# Patient Record
Sex: FEMALE | Race: WHITE | HISPANIC OR LATINO
[De-identification: names, ages, dates, MRNs, and addresses within clinical notes are randomized per-mention and may not be internally consistent; named-entity substitution may affect disease eponyms.]

---

## 2017-01-04 ENCOUNTER — APPOINTMENT (OUTPATIENT)
Dept: MAMMOGRAPHY | Facility: IMAGING CENTER | Age: 52
End: 2017-01-04

## 2017-01-04 ENCOUNTER — APPOINTMENT (OUTPATIENT)
Dept: ULTRASOUND IMAGING | Facility: IMAGING CENTER | Age: 52
End: 2017-01-04

## 2017-01-26 ENCOUNTER — APPOINTMENT (OUTPATIENT)
Dept: DERMATOLOGY | Facility: HOSPITAL | Age: 52
End: 2017-01-26

## 2017-01-26 ENCOUNTER — OUTPATIENT (OUTPATIENT)
Dept: OUTPATIENT SERVICES | Facility: HOSPITAL | Age: 52
LOS: 1 days | End: 2017-01-26
Payer: SELF-PAY

## 2017-01-26 VITALS
HEART RATE: 72 BPM | WEIGHT: 176 LBS | DIASTOLIC BLOOD PRESSURE: 74 MMHG | BODY MASS INDEX: 29.32 KG/M2 | SYSTOLIC BLOOD PRESSURE: 116 MMHG | RESPIRATION RATE: 16 BRPM | HEIGHT: 65 IN

## 2017-01-26 DIAGNOSIS — N20.0 CALCULUS OF KIDNEY: Chronic | ICD-10-CM

## 2017-01-26 DIAGNOSIS — Z90.722 ACQUIRED ABSENCE OF OVARIES, BILATERAL: Chronic | ICD-10-CM

## 2017-01-26 DIAGNOSIS — L98.9 DISORDER OF THE SKIN AND SUBCUTANEOUS TISSUE, UNSPECIFIED: ICD-10-CM

## 2017-01-26 DIAGNOSIS — Z98.89 OTHER SPECIFIED POSTPROCEDURAL STATES: Chronic | ICD-10-CM

## 2017-01-26 DIAGNOSIS — D23.9 OTHER BENIGN NEOPLASM OF SKIN, UNSPECIFIED: ICD-10-CM

## 2017-01-26 DIAGNOSIS — Z09 ENCOUNTER FOR FOLLOW-UP EXAMINATION AFTER COMPLETED TREATMENT FOR CONDITIONS OTHER THAN MALIGNANT NEOPLASM: Chronic | ICD-10-CM

## 2017-01-26 PROCEDURE — G0463: CPT

## 2017-02-08 ENCOUNTER — OUTPATIENT (OUTPATIENT)
Dept: OUTPATIENT SERVICES | Facility: HOSPITAL | Age: 52
LOS: 1 days | End: 2017-02-08
Payer: SELF-PAY

## 2017-02-08 ENCOUNTER — APPOINTMENT (OUTPATIENT)
Dept: FAMILY MEDICINE | Facility: HOSPITAL | Age: 52
End: 2017-02-08

## 2017-02-08 VITALS
HEART RATE: 111 BPM | WEIGHT: 175 LBS | SYSTOLIC BLOOD PRESSURE: 137 MMHG | DIASTOLIC BLOOD PRESSURE: 81 MMHG | RESPIRATION RATE: 16 BRPM | TEMPERATURE: 97.7 F | OXYGEN SATURATION: 96 % | BODY MASS INDEX: 29.16 KG/M2 | HEIGHT: 65 IN

## 2017-02-08 DIAGNOSIS — Z09 ENCOUNTER FOR FOLLOW-UP EXAMINATION AFTER COMPLETED TREATMENT FOR CONDITIONS OTHER THAN MALIGNANT NEOPLASM: Chronic | ICD-10-CM

## 2017-02-08 DIAGNOSIS — N20.0 CALCULUS OF KIDNEY: Chronic | ICD-10-CM

## 2017-02-08 DIAGNOSIS — M25.569 PAIN IN UNSPECIFIED KNEE: ICD-10-CM

## 2017-02-08 DIAGNOSIS — G89.29 PAIN IN UNSPECIFIED FOOT: ICD-10-CM

## 2017-02-08 DIAGNOSIS — M79.673 PAIN IN UNSPECIFIED FOOT: ICD-10-CM

## 2017-02-08 DIAGNOSIS — Z90.722 ACQUIRED ABSENCE OF OVARIES, BILATERAL: Chronic | ICD-10-CM

## 2017-02-08 DIAGNOSIS — Z98.89 OTHER SPECIFIED POSTPROCEDURAL STATES: Chronic | ICD-10-CM

## 2017-02-08 PROCEDURE — G0463: CPT

## 2017-02-10 ENCOUNTER — OUTPATIENT (OUTPATIENT)
Dept: OUTPATIENT SERVICES | Facility: HOSPITAL | Age: 52
LOS: 1 days | Discharge: HOME | End: 2017-02-10

## 2017-02-10 DIAGNOSIS — C19 MALIGNANT NEOPLASM OF RECTOSIGMOID JUNCTION: ICD-10-CM

## 2017-02-10 DIAGNOSIS — Z90.722 ACQUIRED ABSENCE OF OVARIES, BILATERAL: Chronic | ICD-10-CM

## 2017-02-10 DIAGNOSIS — N20.0 CALCULUS OF KIDNEY: Chronic | ICD-10-CM

## 2017-02-10 DIAGNOSIS — Z98.89 OTHER SPECIFIED POSTPROCEDURAL STATES: Chronic | ICD-10-CM

## 2017-02-10 DIAGNOSIS — Z09 ENCOUNTER FOR FOLLOW-UP EXAMINATION AFTER COMPLETED TREATMENT FOR CONDITIONS OTHER THAN MALIGNANT NEOPLASM: Chronic | ICD-10-CM

## 2017-02-12 ENCOUNTER — RESULT REVIEW (OUTPATIENT)
Age: 52
End: 2017-02-12

## 2017-02-13 ENCOUNTER — APPOINTMENT (OUTPATIENT)
Dept: HEMATOLOGY ONCOLOGY | Facility: CLINIC | Age: 52
End: 2017-02-13

## 2017-02-13 ENCOUNTER — OUTPATIENT (OUTPATIENT)
Dept: OUTPATIENT SERVICES | Facility: HOSPITAL | Age: 52
LOS: 1 days | End: 2017-02-13
Payer: SELF-PAY

## 2017-02-13 VITALS
DIASTOLIC BLOOD PRESSURE: 73 MMHG | HEART RATE: 84 BPM | TEMPERATURE: 97.3 F | BODY MASS INDEX: 29.68 KG/M2 | RESPIRATION RATE: 16 BRPM | WEIGHT: 178.35 LBS | SYSTOLIC BLOOD PRESSURE: 110 MMHG | OXYGEN SATURATION: 97 %

## 2017-02-13 DIAGNOSIS — Z98.89 OTHER SPECIFIED POSTPROCEDURAL STATES: Chronic | ICD-10-CM

## 2017-02-13 DIAGNOSIS — N20.0 CALCULUS OF KIDNEY: Chronic | ICD-10-CM

## 2017-02-13 DIAGNOSIS — Z09 ENCOUNTER FOR FOLLOW-UP EXAMINATION AFTER COMPLETED TREATMENT FOR CONDITIONS OTHER THAN MALIGNANT NEOPLASM: Chronic | ICD-10-CM

## 2017-02-13 DIAGNOSIS — Z90.722 ACQUIRED ABSENCE OF OVARIES, BILATERAL: Chronic | ICD-10-CM

## 2017-02-13 DIAGNOSIS — G62.0 DRUG-INDUCED POLYNEUROPATHY: ICD-10-CM

## 2017-02-13 LAB
HCT VFR BLD CALC: 34.7 % — SIGNIFICANT CHANGE UP (ref 34.5–45)
HGB BLD-MCNC: 12.2 G/DL — SIGNIFICANT CHANGE UP (ref 11.5–15.5)
MCHC RBC-ENTMCNC: 30.9 PG — SIGNIFICANT CHANGE UP (ref 27–34)
MCHC RBC-ENTMCNC: 35.2 G/DL — SIGNIFICANT CHANGE UP (ref 32–36)
MCV RBC AUTO: 87.8 FL — SIGNIFICANT CHANGE UP (ref 80–100)
PLATELET # BLD AUTO: 179 K/UL — SIGNIFICANT CHANGE UP (ref 150–400)
RBC # BLD: 3.96 M/UL — SIGNIFICANT CHANGE UP (ref 3.8–5.2)
RBC # FLD: 11.2 % — SIGNIFICANT CHANGE UP (ref 10.3–14.5)
WBC # BLD: 7.6 K/UL — SIGNIFICANT CHANGE UP (ref 3.8–10.5)
WBC # FLD AUTO: 7.6 K/UL — SIGNIFICANT CHANGE UP (ref 3.8–10.5)

## 2017-02-13 PROCEDURE — 85025 COMPLETE CBC W/AUTO DIFF WBC: CPT

## 2017-02-13 PROCEDURE — 80053 COMPREHEN METABOLIC PANEL: CPT

## 2017-02-13 PROCEDURE — 80061 LIPID PANEL: CPT

## 2017-02-13 PROCEDURE — 86431 RHEUMATOID FACTOR QUANT: CPT

## 2017-02-13 PROCEDURE — 83036 HEMOGLOBIN GLYCOSYLATED A1C: CPT

## 2017-02-13 PROCEDURE — 36415 COLL VENOUS BLD VENIPUNCTURE: CPT

## 2017-02-14 LAB
ALBUMIN SERPL ELPH-MCNC: 4.5 G/DL — SIGNIFICANT CHANGE UP (ref 3.3–5)
ALP SERPL-CCNC: 104 U/L — SIGNIFICANT CHANGE UP (ref 40–120)
ALT FLD-CCNC: 24 U/L — SIGNIFICANT CHANGE UP (ref 10–45)
ANION GAP SERPL CALC-SCNC: 16 MMOL/L — SIGNIFICANT CHANGE UP (ref 5–17)
AST SERPL-CCNC: 18 U/L — SIGNIFICANT CHANGE UP (ref 10–40)
BILIRUB SERPL-MCNC: 0.4 MG/DL — SIGNIFICANT CHANGE UP (ref 0.2–1.2)
BUN SERPL-MCNC: 12 MG/DL — SIGNIFICANT CHANGE UP (ref 7–23)
CALCIUM SERPL-MCNC: 9.3 MG/DL — SIGNIFICANT CHANGE UP (ref 8.4–10.5)
CEA SERPL-MCNC: 0.5 NG/ML — SIGNIFICANT CHANGE UP (ref 0–3.8)
CHLORIDE SERPL-SCNC: 102 MMOL/L — SIGNIFICANT CHANGE UP (ref 96–108)
CO2 SERPL-SCNC: 23 MMOL/L — SIGNIFICANT CHANGE UP (ref 22–31)
CREAT SERPL-MCNC: 0.55 MG/DL — SIGNIFICANT CHANGE UP (ref 0.5–1.3)
GLUCOSE SERPL-MCNC: 185 MG/DL — HIGH (ref 70–99)
POTASSIUM SERPL-MCNC: 3.8 MMOL/L — SIGNIFICANT CHANGE UP (ref 3.5–5.3)
POTASSIUM SERPL-SCNC: 3.8 MMOL/L — SIGNIFICANT CHANGE UP (ref 3.5–5.3)
PROT SERPL-MCNC: 7.4 G/DL — SIGNIFICANT CHANGE UP (ref 6–8.3)
SODIUM SERPL-SCNC: 141 MMOL/L — SIGNIFICANT CHANGE UP (ref 135–145)

## 2017-03-08 ENCOUNTER — APPOINTMENT (OUTPATIENT)
Dept: FAMILY MEDICINE | Facility: HOSPITAL | Age: 52
End: 2017-03-08

## 2017-03-08 DIAGNOSIS — E78.1 PURE HYPERGLYCERIDEMIA: ICD-10-CM

## 2017-03-13 PROBLEM — E78.1 HYPERTRIGLYCERIDEMIA: Status: ACTIVE | Noted: 2017-03-13

## 2017-03-16 ENCOUNTER — MESSAGE (OUTPATIENT)
Age: 52
End: 2017-03-16

## 2017-03-20 ENCOUNTER — OTHER (OUTPATIENT)
Age: 52
End: 2017-03-20

## 2017-05-11 ENCOUNTER — OUTPATIENT (OUTPATIENT)
Dept: OUTPATIENT SERVICES | Facility: HOSPITAL | Age: 52
LOS: 1 days | Discharge: HOME | End: 2017-05-11

## 2017-05-11 DIAGNOSIS — Z90.722 ACQUIRED ABSENCE OF OVARIES, BILATERAL: Chronic | ICD-10-CM

## 2017-05-11 DIAGNOSIS — Z09 ENCOUNTER FOR FOLLOW-UP EXAMINATION AFTER COMPLETED TREATMENT FOR CONDITIONS OTHER THAN MALIGNANT NEOPLASM: Chronic | ICD-10-CM

## 2017-05-11 DIAGNOSIS — Z98.89 OTHER SPECIFIED POSTPROCEDURAL STATES: Chronic | ICD-10-CM

## 2017-05-11 DIAGNOSIS — N20.0 CALCULUS OF KIDNEY: Chronic | ICD-10-CM

## 2017-05-11 DIAGNOSIS — C19 MALIGNANT NEOPLASM OF RECTOSIGMOID JUNCTION: ICD-10-CM

## 2017-05-15 ENCOUNTER — RESULT REVIEW (OUTPATIENT)
Age: 52
End: 2017-05-15

## 2017-05-15 ENCOUNTER — APPOINTMENT (OUTPATIENT)
Dept: HEMATOLOGY ONCOLOGY | Facility: CLINIC | Age: 52
End: 2017-05-15

## 2017-05-15 VITALS
BODY MASS INDEX: 28.84 KG/M2 | RESPIRATION RATE: 16 BRPM | WEIGHT: 173.28 LBS | SYSTOLIC BLOOD PRESSURE: 105 MMHG | TEMPERATURE: 98.4 F | HEART RATE: 79 BPM | OXYGEN SATURATION: 97 % | DIASTOLIC BLOOD PRESSURE: 66 MMHG

## 2017-05-15 DIAGNOSIS — R92.8 OTHER ABNORMAL AND INCONCLUSIVE FINDINGS ON DIAGNOSTIC IMAGING OF BREAST: ICD-10-CM

## 2017-05-15 LAB
CEA SERPL-MCNC: 0.6 NG/ML — SIGNIFICANT CHANGE UP (ref 0–3.8)
HCT VFR BLD CALC: 35.9 % — SIGNIFICANT CHANGE UP (ref 34.5–45)
HGB BLD-MCNC: 13 G/DL — SIGNIFICANT CHANGE UP (ref 11.5–15.5)
MCHC RBC-ENTMCNC: 31.1 PG — SIGNIFICANT CHANGE UP (ref 27–34)
MCHC RBC-ENTMCNC: 36.2 G/DL — HIGH (ref 32–36)
MCV RBC AUTO: 86 FL — SIGNIFICANT CHANGE UP (ref 80–100)
PLATELET # BLD AUTO: 195 K/UL — SIGNIFICANT CHANGE UP (ref 150–400)
RBC # BLD: 4.17 M/UL — SIGNIFICANT CHANGE UP (ref 3.8–5.2)
RBC # FLD: 11 % — SIGNIFICANT CHANGE UP (ref 10.3–14.5)
WBC # BLD: 6 K/UL — SIGNIFICANT CHANGE UP (ref 3.8–10.5)
WBC # FLD AUTO: 6 K/UL — SIGNIFICANT CHANGE UP (ref 3.8–10.5)

## 2017-05-15 RX ORDER — FENOFIBRATE 145 MG/1
145 TABLET, COATED ORAL DAILY
Qty: 30 | Refills: 1 | Status: DISCONTINUED | COMMUNITY
Start: 2017-03-13 | End: 2017-05-15

## 2017-05-15 RX ORDER — SIMVASTATIN 20 MG/1
20 TABLET, FILM COATED ORAL DAILY
Qty: 90 | Refills: 2 | Status: COMPLETED | COMMUNITY
Start: 2017-03-20 | End: 2017-05-15

## 2017-05-16 ENCOUNTER — MESSAGE (OUTPATIENT)
Age: 52
End: 2017-05-16

## 2017-05-16 LAB
ALBUMIN SERPL ELPH-MCNC: 4.5 G/DL — SIGNIFICANT CHANGE UP (ref 3.3–5)
ALP SERPL-CCNC: 104 U/L — SIGNIFICANT CHANGE UP (ref 40–120)
ALT FLD-CCNC: 22 U/L — SIGNIFICANT CHANGE UP (ref 10–45)
ANION GAP SERPL CALC-SCNC: 19 MMOL/L — HIGH (ref 5–17)
AST SERPL-CCNC: 20 U/L — SIGNIFICANT CHANGE UP (ref 10–40)
BASOPHILS # BLD AUTO: 0 K/UL — SIGNIFICANT CHANGE UP (ref 0–0.2)
BASOPHILS NFR BLD AUTO: 0.3 % — SIGNIFICANT CHANGE UP (ref 0–2)
BILIRUB SERPL-MCNC: 0.4 MG/DL — SIGNIFICANT CHANGE UP (ref 0.2–1.2)
BUN SERPL-MCNC: 17 MG/DL — SIGNIFICANT CHANGE UP (ref 7–23)
CALCIUM SERPL-MCNC: 10.1 MG/DL — SIGNIFICANT CHANGE UP (ref 8.4–10.5)
CHLORIDE SERPL-SCNC: 99 MMOL/L — SIGNIFICANT CHANGE UP (ref 96–108)
CO2 SERPL-SCNC: 22 MMOL/L — SIGNIFICANT CHANGE UP (ref 22–31)
CREAT SERPL-MCNC: 0.72 MG/DL — SIGNIFICANT CHANGE UP (ref 0.5–1.3)
EOSINOPHIL # BLD AUTO: 0.1 K/UL — SIGNIFICANT CHANGE UP (ref 0–0.5)
EOSINOPHIL NFR BLD AUTO: 2.2 % — SIGNIFICANT CHANGE UP (ref 0–6)
GLUCOSE SERPL-MCNC: 104 MG/DL — HIGH (ref 70–99)
LYMPHOCYTES # BLD AUTO: 1.1 K/UL — SIGNIFICANT CHANGE UP (ref 1–3.3)
LYMPHOCYTES # BLD AUTO: 18.9 % — SIGNIFICANT CHANGE UP (ref 13–44)
MONOCYTES # BLD AUTO: 0.3 K/UL — SIGNIFICANT CHANGE UP (ref 0–0.9)
MONOCYTES NFR BLD AUTO: 5.2 % — SIGNIFICANT CHANGE UP (ref 2–14)
NEUTROPHILS # BLD AUTO: 4.4 K/UL — SIGNIFICANT CHANGE UP (ref 1.8–7.4)
NEUTROPHILS NFR BLD AUTO: 73.5 % — SIGNIFICANT CHANGE UP (ref 43–77)
POTASSIUM SERPL-MCNC: 4 MMOL/L — SIGNIFICANT CHANGE UP (ref 3.5–5.3)
POTASSIUM SERPL-SCNC: 4 MMOL/L — SIGNIFICANT CHANGE UP (ref 3.5–5.3)
PROT SERPL-MCNC: 7.7 G/DL — SIGNIFICANT CHANGE UP (ref 6–8.3)
SODIUM SERPL-SCNC: 140 MMOL/L — SIGNIFICANT CHANGE UP (ref 135–145)
TRIGL SERPL-MCNC: 1180 MG/DL — HIGH (ref 10–149)

## 2017-05-21 ENCOUNTER — FORM ENCOUNTER (OUTPATIENT)
Age: 52
End: 2017-05-21

## 2017-05-22 ENCOUNTER — OUTPATIENT (OUTPATIENT)
Dept: OUTPATIENT SERVICES | Facility: HOSPITAL | Age: 52
LOS: 1 days | End: 2017-05-22
Payer: COMMERCIAL

## 2017-05-22 ENCOUNTER — APPOINTMENT (OUTPATIENT)
Dept: CT IMAGING | Facility: IMAGING CENTER | Age: 52
End: 2017-05-22

## 2017-05-22 DIAGNOSIS — Z98.89 OTHER SPECIFIED POSTPROCEDURAL STATES: Chronic | ICD-10-CM

## 2017-05-22 DIAGNOSIS — Z90.722 ACQUIRED ABSENCE OF OVARIES, BILATERAL: Chronic | ICD-10-CM

## 2017-05-22 DIAGNOSIS — Z09 ENCOUNTER FOR FOLLOW-UP EXAMINATION AFTER COMPLETED TREATMENT FOR CONDITIONS OTHER THAN MALIGNANT NEOPLASM: Chronic | ICD-10-CM

## 2017-05-22 DIAGNOSIS — N20.0 CALCULUS OF KIDNEY: Chronic | ICD-10-CM

## 2017-05-22 DIAGNOSIS — C20 MALIGNANT NEOPLASM OF RECTUM: ICD-10-CM

## 2017-05-22 PROCEDURE — 74177 CT ABD & PELVIS W/CONTRAST: CPT

## 2017-05-22 PROCEDURE — 71260 CT THORAX DX C+: CPT

## 2017-08-02 ENCOUNTER — OUTPATIENT (OUTPATIENT)
Dept: OUTPATIENT SERVICES | Facility: HOSPITAL | Age: 52
LOS: 1 days | Discharge: HOME | End: 2017-08-02

## 2017-08-02 DIAGNOSIS — N20.0 CALCULUS OF KIDNEY: Chronic | ICD-10-CM

## 2017-08-02 DIAGNOSIS — Z09 ENCOUNTER FOR FOLLOW-UP EXAMINATION AFTER COMPLETED TREATMENT FOR CONDITIONS OTHER THAN MALIGNANT NEOPLASM: Chronic | ICD-10-CM

## 2017-08-02 DIAGNOSIS — C19 MALIGNANT NEOPLASM OF RECTOSIGMOID JUNCTION: ICD-10-CM

## 2017-08-02 DIAGNOSIS — Z90.722 ACQUIRED ABSENCE OF OVARIES, BILATERAL: Chronic | ICD-10-CM

## 2017-08-02 DIAGNOSIS — Z98.89 OTHER SPECIFIED POSTPROCEDURAL STATES: Chronic | ICD-10-CM

## 2017-08-07 ENCOUNTER — APPOINTMENT (OUTPATIENT)
Dept: HEMATOLOGY ONCOLOGY | Facility: CLINIC | Age: 52
End: 2017-08-07

## 2017-08-07 ENCOUNTER — RESULT REVIEW (OUTPATIENT)
Age: 52
End: 2017-08-07

## 2017-08-07 VITALS
TEMPERATURE: 98.1 F | SYSTOLIC BLOOD PRESSURE: 120 MMHG | HEART RATE: 67 BPM | BODY MASS INDEX: 29.13 KG/M2 | WEIGHT: 174.83 LBS | HEIGHT: 65 IN | OXYGEN SATURATION: 98 % | RESPIRATION RATE: 16 BRPM | DIASTOLIC BLOOD PRESSURE: 75 MMHG

## 2017-08-07 LAB
ALBUMIN SERPL ELPH-MCNC: 4.7 G/DL — SIGNIFICANT CHANGE UP (ref 3.3–5)
ALP SERPL-CCNC: 97 U/L — SIGNIFICANT CHANGE UP (ref 40–120)
ALT FLD-CCNC: 22 U/L — SIGNIFICANT CHANGE UP (ref 10–45)
ANION GAP SERPL CALC-SCNC: 15 MMOL/L — SIGNIFICANT CHANGE UP (ref 5–17)
AST SERPL-CCNC: 20 U/L — SIGNIFICANT CHANGE UP (ref 10–40)
BILIRUB SERPL-MCNC: 0.5 MG/DL — SIGNIFICANT CHANGE UP (ref 0.2–1.2)
BUN SERPL-MCNC: 23 MG/DL — SIGNIFICANT CHANGE UP (ref 7–23)
CALCIUM SERPL-MCNC: 9.7 MG/DL — SIGNIFICANT CHANGE UP (ref 8.4–10.5)
CEA SERPL-MCNC: 1 NG/ML — SIGNIFICANT CHANGE UP (ref 0–3.8)
CHLORIDE SERPL-SCNC: 101 MMOL/L — SIGNIFICANT CHANGE UP (ref 96–108)
CO2 SERPL-SCNC: 24 MMOL/L — SIGNIFICANT CHANGE UP (ref 22–31)
CREAT SERPL-MCNC: 0.66 MG/DL — SIGNIFICANT CHANGE UP (ref 0.5–1.3)
GLUCOSE SERPL-MCNC: 108 MG/DL — HIGH (ref 70–99)
HCT VFR BLD CALC: 36.5 % — SIGNIFICANT CHANGE UP (ref 34.5–45)
HGB BLD-MCNC: 12.7 G/DL — SIGNIFICANT CHANGE UP (ref 11.5–15.5)
MCHC RBC-ENTMCNC: 30 PG — SIGNIFICANT CHANGE UP (ref 27–34)
MCHC RBC-ENTMCNC: 34.7 G/DL — SIGNIFICANT CHANGE UP (ref 32–36)
MCV RBC AUTO: 86.3 FL — SIGNIFICANT CHANGE UP (ref 80–100)
PLATELET # BLD AUTO: 203 K/UL — SIGNIFICANT CHANGE UP (ref 150–400)
POTASSIUM SERPL-MCNC: 4.4 MMOL/L — SIGNIFICANT CHANGE UP (ref 3.5–5.3)
POTASSIUM SERPL-SCNC: 4.4 MMOL/L — SIGNIFICANT CHANGE UP (ref 3.5–5.3)
PROT SERPL-MCNC: 7.4 G/DL — SIGNIFICANT CHANGE UP (ref 6–8.3)
RBC # BLD: 4.23 M/UL — SIGNIFICANT CHANGE UP (ref 3.8–5.2)
RBC # FLD: 12.7 % — SIGNIFICANT CHANGE UP (ref 10.3–14.5)
SODIUM SERPL-SCNC: 140 MMOL/L — SIGNIFICANT CHANGE UP (ref 135–145)
WBC # BLD: 5.8 K/UL — SIGNIFICANT CHANGE UP (ref 3.8–10.5)
WBC # FLD AUTO: 5.8 K/UL — SIGNIFICANT CHANGE UP (ref 3.8–10.5)

## 2017-08-07 RX ORDER — NAPROXEN 500 MG/1
500 TABLET ORAL
Qty: 14 | Refills: 0 | Status: COMPLETED | COMMUNITY
Start: 2017-02-08 | End: 2017-08-07

## 2017-08-07 RX ORDER — OXYCODONE AND ACETAMINOPHEN 5; 325 MG/1; MG/1
5-325 TABLET ORAL
Qty: 40 | Refills: 0 | Status: DISCONTINUED | COMMUNITY
Start: 2017-05-15 | End: 2017-08-07

## 2017-09-10 ENCOUNTER — FORM ENCOUNTER (OUTPATIENT)
Age: 52
End: 2017-09-10

## 2017-09-11 ENCOUNTER — APPOINTMENT (OUTPATIENT)
Dept: ULTRASOUND IMAGING | Facility: IMAGING CENTER | Age: 52
End: 2017-09-11
Payer: COMMERCIAL

## 2017-09-11 ENCOUNTER — OUTPATIENT (OUTPATIENT)
Dept: OUTPATIENT SERVICES | Facility: HOSPITAL | Age: 52
LOS: 1 days | End: 2017-09-11
Payer: COMMERCIAL

## 2017-09-11 DIAGNOSIS — Z09 ENCOUNTER FOR FOLLOW-UP EXAMINATION AFTER COMPLETED TREATMENT FOR CONDITIONS OTHER THAN MALIGNANT NEOPLASM: Chronic | ICD-10-CM

## 2017-09-11 DIAGNOSIS — Z98.89 OTHER SPECIFIED POSTPROCEDURAL STATES: Chronic | ICD-10-CM

## 2017-09-11 DIAGNOSIS — R92.8 OTHER ABNORMAL AND INCONCLUSIVE FINDINGS ON DIAGNOSTIC IMAGING OF BREAST: ICD-10-CM

## 2017-09-11 DIAGNOSIS — N20.0 CALCULUS OF KIDNEY: Chronic | ICD-10-CM

## 2017-09-11 DIAGNOSIS — Z90.722 ACQUIRED ABSENCE OF OVARIES, BILATERAL: Chronic | ICD-10-CM

## 2017-09-11 PROCEDURE — 76642 ULTRASOUND BREAST LIMITED: CPT | Mod: 26,RT

## 2017-09-11 PROCEDURE — 76642 ULTRASOUND BREAST LIMITED: CPT

## 2017-11-03 ENCOUNTER — OUTPATIENT (OUTPATIENT)
Dept: OUTPATIENT SERVICES | Facility: HOSPITAL | Age: 52
LOS: 1 days | Discharge: HOME | End: 2017-11-03

## 2017-11-03 DIAGNOSIS — Z98.89 OTHER SPECIFIED POSTPROCEDURAL STATES: Chronic | ICD-10-CM

## 2017-11-03 DIAGNOSIS — Z09 ENCOUNTER FOR FOLLOW-UP EXAMINATION AFTER COMPLETED TREATMENT FOR CONDITIONS OTHER THAN MALIGNANT NEOPLASM: Chronic | ICD-10-CM

## 2017-11-03 DIAGNOSIS — C19 MALIGNANT NEOPLASM OF RECTOSIGMOID JUNCTION: ICD-10-CM

## 2017-11-03 DIAGNOSIS — N20.0 CALCULUS OF KIDNEY: Chronic | ICD-10-CM

## 2017-11-03 DIAGNOSIS — Z90.722 ACQUIRED ABSENCE OF OVARIES, BILATERAL: Chronic | ICD-10-CM

## 2017-11-06 ENCOUNTER — RESULT REVIEW (OUTPATIENT)
Age: 52
End: 2017-11-06

## 2017-11-06 ENCOUNTER — APPOINTMENT (OUTPATIENT)
Dept: HEMATOLOGY ONCOLOGY | Facility: CLINIC | Age: 52
End: 2017-11-06

## 2017-11-06 VITALS
RESPIRATION RATE: 16 BRPM | DIASTOLIC BLOOD PRESSURE: 71 MMHG | WEIGHT: 173.72 LBS | SYSTOLIC BLOOD PRESSURE: 108 MMHG | TEMPERATURE: 97.9 F | HEART RATE: 92 BPM | BODY MASS INDEX: 28.91 KG/M2 | OXYGEN SATURATION: 96 %

## 2017-11-06 LAB
ALBUMIN SERPL ELPH-MCNC: 4.6 G/DL — SIGNIFICANT CHANGE UP (ref 3.3–5)
ALP SERPL-CCNC: 93 U/L — SIGNIFICANT CHANGE UP (ref 40–120)
ALT FLD-CCNC: 20 U/L — SIGNIFICANT CHANGE UP (ref 10–45)
ANION GAP SERPL CALC-SCNC: 17 MMOL/L — SIGNIFICANT CHANGE UP (ref 5–17)
AST SERPL-CCNC: 23 U/L — SIGNIFICANT CHANGE UP (ref 10–40)
BILIRUB SERPL-MCNC: 0.4 MG/DL — SIGNIFICANT CHANGE UP (ref 0.2–1.2)
BUN SERPL-MCNC: 16 MG/DL — SIGNIFICANT CHANGE UP (ref 7–23)
CALCIUM SERPL-MCNC: 9.6 MG/DL — SIGNIFICANT CHANGE UP (ref 8.4–10.5)
CEA SERPL-MCNC: 0.9 NG/ML — SIGNIFICANT CHANGE UP (ref 0–3.8)
CHLORIDE SERPL-SCNC: 103 MMOL/L — SIGNIFICANT CHANGE UP (ref 96–108)
CO2 SERPL-SCNC: 23 MMOL/L — SIGNIFICANT CHANGE UP (ref 22–31)
CREAT SERPL-MCNC: 0.67 MG/DL — SIGNIFICANT CHANGE UP (ref 0.5–1.3)
GLUCOSE SERPL-MCNC: 80 MG/DL — SIGNIFICANT CHANGE UP (ref 70–99)
HCT VFR BLD CALC: 34.8 % — SIGNIFICANT CHANGE UP (ref 34.5–45)
HGB BLD-MCNC: 12.8 G/DL — SIGNIFICANT CHANGE UP (ref 11.5–15.5)
MCHC RBC-ENTMCNC: 31.4 PG — SIGNIFICANT CHANGE UP (ref 27–34)
MCHC RBC-ENTMCNC: 36.7 G/DL — HIGH (ref 32–36)
MCV RBC AUTO: 85.6 FL — SIGNIFICANT CHANGE UP (ref 80–100)
PLATELET # BLD AUTO: 186 K/UL — SIGNIFICANT CHANGE UP (ref 150–400)
POTASSIUM SERPL-MCNC: 4.5 MMOL/L — SIGNIFICANT CHANGE UP (ref 3.5–5.3)
POTASSIUM SERPL-SCNC: 4.5 MMOL/L — SIGNIFICANT CHANGE UP (ref 3.5–5.3)
PROT SERPL-MCNC: 7.4 G/DL — SIGNIFICANT CHANGE UP (ref 6–8.3)
RBC # BLD: 4.07 M/UL — SIGNIFICANT CHANGE UP (ref 3.8–5.2)
RBC # FLD: 11 % — SIGNIFICANT CHANGE UP (ref 10.3–14.5)
SODIUM SERPL-SCNC: 143 MMOL/L — SIGNIFICANT CHANGE UP (ref 135–145)
WBC # BLD: 6.8 K/UL — SIGNIFICANT CHANGE UP (ref 3.8–10.5)
WBC # FLD AUTO: 6.8 K/UL — SIGNIFICANT CHANGE UP (ref 3.8–10.5)

## 2017-12-07 ENCOUNTER — OUTPATIENT (OUTPATIENT)
Dept: OUTPATIENT SERVICES | Facility: HOSPITAL | Age: 52
LOS: 1 days | Discharge: HOME | End: 2017-12-07

## 2017-12-07 DIAGNOSIS — C19 MALIGNANT NEOPLASM OF RECTOSIGMOID JUNCTION: ICD-10-CM

## 2017-12-07 DIAGNOSIS — Z90.722 ACQUIRED ABSENCE OF OVARIES, BILATERAL: Chronic | ICD-10-CM

## 2017-12-07 DIAGNOSIS — N20.0 CALCULUS OF KIDNEY: Chronic | ICD-10-CM

## 2017-12-07 DIAGNOSIS — Z09 ENCOUNTER FOR FOLLOW-UP EXAMINATION AFTER COMPLETED TREATMENT FOR CONDITIONS OTHER THAN MALIGNANT NEOPLASM: Chronic | ICD-10-CM

## 2017-12-07 DIAGNOSIS — Z98.89 OTHER SPECIFIED POSTPROCEDURAL STATES: Chronic | ICD-10-CM

## 2017-12-14 ENCOUNTER — APPOINTMENT (OUTPATIENT)
Dept: HEMATOLOGY ONCOLOGY | Facility: CLINIC | Age: 52
End: 2017-12-14

## 2017-12-14 VITALS
OXYGEN SATURATION: 98 % | BODY MASS INDEX: 28.62 KG/M2 | DIASTOLIC BLOOD PRESSURE: 87 MMHG | HEART RATE: 75 BPM | SYSTOLIC BLOOD PRESSURE: 138 MMHG | TEMPERATURE: 98.8 F | WEIGHT: 171.96 LBS | RESPIRATION RATE: 16 BRPM

## 2018-01-19 ENCOUNTER — APPOINTMENT (OUTPATIENT)
Dept: COLORECTAL SURGERY | Facility: CLINIC | Age: 53
End: 2018-01-19
Payer: MEDICAID

## 2018-01-19 PROCEDURE — 99213 OFFICE O/P EST LOW 20 MIN: CPT

## 2018-01-21 ENCOUNTER — FORM ENCOUNTER (OUTPATIENT)
Age: 53
End: 2018-01-21

## 2018-01-22 ENCOUNTER — OUTPATIENT (OUTPATIENT)
Dept: OUTPATIENT SERVICES | Facility: HOSPITAL | Age: 53
LOS: 1 days | End: 2018-01-22
Payer: MEDICAID

## 2018-01-22 ENCOUNTER — APPOINTMENT (OUTPATIENT)
Dept: ULTRASOUND IMAGING | Facility: IMAGING CENTER | Age: 53
End: 2018-01-22

## 2018-01-22 ENCOUNTER — APPOINTMENT (OUTPATIENT)
Dept: MAMMOGRAPHY | Facility: IMAGING CENTER | Age: 53
End: 2018-01-22

## 2018-01-22 ENCOUNTER — OTHER (OUTPATIENT)
Age: 53
End: 2018-01-22

## 2018-01-22 DIAGNOSIS — Z98.89 OTHER SPECIFIED POSTPROCEDURAL STATES: Chronic | ICD-10-CM

## 2018-01-22 DIAGNOSIS — Z00.8 ENCOUNTER FOR OTHER GENERAL EXAMINATION: ICD-10-CM

## 2018-01-22 DIAGNOSIS — Z09 ENCOUNTER FOR FOLLOW-UP EXAMINATION AFTER COMPLETED TREATMENT FOR CONDITIONS OTHER THAN MALIGNANT NEOPLASM: Chronic | ICD-10-CM

## 2018-01-22 DIAGNOSIS — Z90.722 ACQUIRED ABSENCE OF OVARIES, BILATERAL: Chronic | ICD-10-CM

## 2018-01-22 DIAGNOSIS — N20.0 CALCULUS OF KIDNEY: Chronic | ICD-10-CM

## 2018-01-22 PROCEDURE — 76642 ULTRASOUND BREAST LIMITED: CPT | Mod: 26,RT

## 2018-01-22 PROCEDURE — 77067 SCR MAMMO BI INCL CAD: CPT | Mod: 26

## 2018-01-22 PROCEDURE — 77067 SCR MAMMO BI INCL CAD: CPT

## 2018-01-22 PROCEDURE — 77063 BREAST TOMOSYNTHESIS BI: CPT

## 2018-01-22 PROCEDURE — 76642 ULTRASOUND BREAST LIMITED: CPT

## 2018-01-22 PROCEDURE — 77063 BREAST TOMOSYNTHESIS BI: CPT | Mod: 26

## 2018-04-16 ENCOUNTER — OUTPATIENT (OUTPATIENT)
Dept: OUTPATIENT SERVICES | Facility: HOSPITAL | Age: 53
LOS: 1 days | Discharge: HOME | End: 2018-04-16

## 2018-04-16 DIAGNOSIS — Z09 ENCOUNTER FOR FOLLOW-UP EXAMINATION AFTER COMPLETED TREATMENT FOR CONDITIONS OTHER THAN MALIGNANT NEOPLASM: Chronic | ICD-10-CM

## 2018-04-16 DIAGNOSIS — Z98.89 OTHER SPECIFIED POSTPROCEDURAL STATES: Chronic | ICD-10-CM

## 2018-04-16 DIAGNOSIS — C19 MALIGNANT NEOPLASM OF RECTOSIGMOID JUNCTION: ICD-10-CM

## 2018-04-16 DIAGNOSIS — Z90.722 ACQUIRED ABSENCE OF OVARIES, BILATERAL: Chronic | ICD-10-CM

## 2018-04-16 DIAGNOSIS — N20.0 CALCULUS OF KIDNEY: Chronic | ICD-10-CM

## 2018-04-19 ENCOUNTER — RESULT REVIEW (OUTPATIENT)
Age: 53
End: 2018-04-19

## 2018-04-19 ENCOUNTER — APPOINTMENT (OUTPATIENT)
Dept: HEMATOLOGY ONCOLOGY | Facility: CLINIC | Age: 53
End: 2018-04-19

## 2018-04-19 VITALS
RESPIRATION RATE: 16 BRPM | DIASTOLIC BLOOD PRESSURE: 79 MMHG | HEART RATE: 77 BPM | SYSTOLIC BLOOD PRESSURE: 122 MMHG | BODY MASS INDEX: 29.35 KG/M2 | TEMPERATURE: 98.2 F | OXYGEN SATURATION: 99 % | WEIGHT: 176.37 LBS

## 2018-04-19 LAB
ALBUMIN SERPL ELPH-MCNC: 4.6 G/DL — SIGNIFICANT CHANGE UP (ref 3.3–5)
ALP SERPL-CCNC: 88 U/L — SIGNIFICANT CHANGE UP (ref 40–120)
ALT FLD-CCNC: 18 U/L — SIGNIFICANT CHANGE UP (ref 10–45)
ANION GAP SERPL CALC-SCNC: 15 MMOL/L — SIGNIFICANT CHANGE UP (ref 5–17)
AST SERPL-CCNC: 19 U/L — SIGNIFICANT CHANGE UP (ref 10–40)
BASOPHILS # BLD AUTO: 0 K/UL — SIGNIFICANT CHANGE UP (ref 0–0.2)
BASOPHILS NFR BLD AUTO: 0.2 % — SIGNIFICANT CHANGE UP (ref 0–2)
BILIRUB SERPL-MCNC: 0.5 MG/DL — SIGNIFICANT CHANGE UP (ref 0.2–1.2)
BUN SERPL-MCNC: 12 MG/DL — SIGNIFICANT CHANGE UP (ref 7–23)
CALCIUM SERPL-MCNC: 9.5 MG/DL — SIGNIFICANT CHANGE UP (ref 8.4–10.5)
CHLORIDE SERPL-SCNC: 101 MMOL/L — SIGNIFICANT CHANGE UP (ref 96–108)
CO2 SERPL-SCNC: 25 MMOL/L — SIGNIFICANT CHANGE UP (ref 22–31)
CREAT SERPL-MCNC: 0.66 MG/DL — SIGNIFICANT CHANGE UP (ref 0.5–1.3)
EOSINOPHIL # BLD AUTO: 0.2 K/UL — SIGNIFICANT CHANGE UP (ref 0–0.5)
EOSINOPHIL NFR BLD AUTO: 2.2 % — SIGNIFICANT CHANGE UP (ref 0–6)
GLUCOSE SERPL-MCNC: 93 MG/DL — SIGNIFICANT CHANGE UP (ref 70–99)
HCT VFR BLD CALC: 37.2 % — SIGNIFICANT CHANGE UP (ref 34.5–45)
HGB BLD-MCNC: 13.4 G/DL — SIGNIFICANT CHANGE UP (ref 11.5–15.5)
LYMPHOCYTES # BLD AUTO: 1.6 K/UL — SIGNIFICANT CHANGE UP (ref 1–3.3)
LYMPHOCYTES # BLD AUTO: 23.1 % — SIGNIFICANT CHANGE UP (ref 13–44)
MCHC RBC-ENTMCNC: 31.3 PG — SIGNIFICANT CHANGE UP (ref 27–34)
MCHC RBC-ENTMCNC: 36 G/DL — SIGNIFICANT CHANGE UP (ref 32–36)
MCV RBC AUTO: 86.8 FL — SIGNIFICANT CHANGE UP (ref 80–100)
MONOCYTES # BLD AUTO: 0.4 K/UL — SIGNIFICANT CHANGE UP (ref 0–0.9)
MONOCYTES NFR BLD AUTO: 6.3 % — SIGNIFICANT CHANGE UP (ref 2–14)
NEUTROPHILS # BLD AUTO: 4.7 K/UL — SIGNIFICANT CHANGE UP (ref 1.8–7.4)
NEUTROPHILS NFR BLD AUTO: 68.1 % — SIGNIFICANT CHANGE UP (ref 43–77)
PLATELET # BLD AUTO: 215 K/UL — SIGNIFICANT CHANGE UP (ref 150–400)
POTASSIUM SERPL-MCNC: 4.6 MMOL/L — SIGNIFICANT CHANGE UP (ref 3.5–5.3)
POTASSIUM SERPL-SCNC: 4.6 MMOL/L — SIGNIFICANT CHANGE UP (ref 3.5–5.3)
PROT SERPL-MCNC: 7.8 G/DL — SIGNIFICANT CHANGE UP (ref 6–8.3)
RBC # BLD: 4.28 M/UL — SIGNIFICANT CHANGE UP (ref 3.8–5.2)
RBC # FLD: 11.5 % — SIGNIFICANT CHANGE UP (ref 10.3–14.5)
SODIUM SERPL-SCNC: 141 MMOL/L — SIGNIFICANT CHANGE UP (ref 135–145)
WBC # BLD: 6.9 K/UL — SIGNIFICANT CHANGE UP (ref 3.8–10.5)
WBC # FLD AUTO: 6.9 K/UL — SIGNIFICANT CHANGE UP (ref 3.8–10.5)

## 2018-04-20 LAB — CEA SERPL-MCNC: 0.4 NG/ML — SIGNIFICANT CHANGE UP (ref 0–3.8)

## 2018-04-23 ENCOUNTER — FORM ENCOUNTER (OUTPATIENT)
Age: 53
End: 2018-04-23

## 2018-04-24 ENCOUNTER — APPOINTMENT (OUTPATIENT)
Dept: CT IMAGING | Facility: IMAGING CENTER | Age: 53
End: 2018-04-24
Payer: MEDICAID

## 2018-04-24 ENCOUNTER — OUTPATIENT (OUTPATIENT)
Dept: OUTPATIENT SERVICES | Facility: HOSPITAL | Age: 53
LOS: 1 days | End: 2018-04-24
Payer: MEDICAID

## 2018-04-24 DIAGNOSIS — N20.0 CALCULUS OF KIDNEY: Chronic | ICD-10-CM

## 2018-04-24 DIAGNOSIS — Z98.89 OTHER SPECIFIED POSTPROCEDURAL STATES: Chronic | ICD-10-CM

## 2018-04-24 DIAGNOSIS — Z85.048 PERSONAL HISTORY OF OTHER MALIGNANT NEOPLASM OF RECTUM, RECTOSIGMOID JUNCTION, AND ANUS: ICD-10-CM

## 2018-04-24 DIAGNOSIS — Z09 ENCOUNTER FOR FOLLOW-UP EXAMINATION AFTER COMPLETED TREATMENT FOR CONDITIONS OTHER THAN MALIGNANT NEOPLASM: Chronic | ICD-10-CM

## 2018-04-24 DIAGNOSIS — Z90.722 ACQUIRED ABSENCE OF OVARIES, BILATERAL: Chronic | ICD-10-CM

## 2018-04-24 PROCEDURE — 71260 CT THORAX DX C+: CPT

## 2018-04-24 PROCEDURE — 82565 ASSAY OF CREATININE: CPT

## 2018-04-24 PROCEDURE — 74177 CT ABD & PELVIS W/CONTRAST: CPT | Mod: 26

## 2018-04-24 PROCEDURE — 71260 CT THORAX DX C+: CPT | Mod: 26

## 2018-04-24 PROCEDURE — 74177 CT ABD & PELVIS W/CONTRAST: CPT

## 2018-09-24 ENCOUNTER — OUTPATIENT (OUTPATIENT)
Dept: OUTPATIENT SERVICES | Facility: HOSPITAL | Age: 53
LOS: 1 days | Discharge: HOME | End: 2018-09-24

## 2018-09-24 DIAGNOSIS — C19 MALIGNANT NEOPLASM OF RECTOSIGMOID JUNCTION: ICD-10-CM

## 2018-09-24 DIAGNOSIS — Z90.722 ACQUIRED ABSENCE OF OVARIES, BILATERAL: Chronic | ICD-10-CM

## 2018-09-24 DIAGNOSIS — Z98.89 OTHER SPECIFIED POSTPROCEDURAL STATES: Chronic | ICD-10-CM

## 2018-09-24 DIAGNOSIS — N20.0 CALCULUS OF KIDNEY: Chronic | ICD-10-CM

## 2018-09-24 DIAGNOSIS — Z09 ENCOUNTER FOR FOLLOW-UP EXAMINATION AFTER COMPLETED TREATMENT FOR CONDITIONS OTHER THAN MALIGNANT NEOPLASM: Chronic | ICD-10-CM

## 2018-10-01 ENCOUNTER — RESULT REVIEW (OUTPATIENT)
Age: 53
End: 2018-10-01

## 2018-10-01 ENCOUNTER — APPOINTMENT (OUTPATIENT)
Dept: HEMATOLOGY ONCOLOGY | Facility: CLINIC | Age: 53
End: 2018-10-01

## 2018-10-01 VITALS
DIASTOLIC BLOOD PRESSURE: 81 MMHG | SYSTOLIC BLOOD PRESSURE: 127 MMHG | WEIGHT: 174.17 LBS | RESPIRATION RATE: 16 BRPM | HEART RATE: 75 BPM | TEMPERATURE: 98.1 F | OXYGEN SATURATION: 97 % | BODY MASS INDEX: 28.98 KG/M2

## 2018-10-01 DIAGNOSIS — N93.9 ABNORMAL UTERINE AND VAGINAL BLEEDING, UNSPECIFIED: ICD-10-CM

## 2018-10-01 LAB
ALBUMIN SERPL ELPH-MCNC: 4.9 G/DL — SIGNIFICANT CHANGE UP (ref 3.3–5)
ALP SERPL-CCNC: 81 U/L — SIGNIFICANT CHANGE UP (ref 40–120)
ALT FLD-CCNC: 16 U/L — SIGNIFICANT CHANGE UP (ref 10–45)
ANION GAP SERPL CALC-SCNC: 14 MMOL/L — SIGNIFICANT CHANGE UP (ref 5–17)
AST SERPL-CCNC: 20 U/L — SIGNIFICANT CHANGE UP (ref 10–40)
BASOPHILS # BLD AUTO: 0 K/UL — SIGNIFICANT CHANGE UP (ref 0–0.2)
BASOPHILS NFR BLD AUTO: 0.4 % — SIGNIFICANT CHANGE UP (ref 0–2)
BILIRUB SERPL-MCNC: 0.5 MG/DL — SIGNIFICANT CHANGE UP (ref 0.2–1.2)
BUN SERPL-MCNC: 19 MG/DL — SIGNIFICANT CHANGE UP (ref 7–23)
CALCIUM SERPL-MCNC: 9.3 MG/DL — SIGNIFICANT CHANGE UP (ref 8.4–10.5)
CEA SERPL-MCNC: 1.1 NG/ML — SIGNIFICANT CHANGE UP (ref 0–3.8)
CHLORIDE SERPL-SCNC: 101 MMOL/L — SIGNIFICANT CHANGE UP (ref 96–108)
CO2 SERPL-SCNC: 23 MMOL/L — SIGNIFICANT CHANGE UP (ref 22–31)
CREAT SERPL-MCNC: 0.66 MG/DL — SIGNIFICANT CHANGE UP (ref 0.5–1.3)
EOSINOPHIL # BLD AUTO: 0.1 K/UL — SIGNIFICANT CHANGE UP (ref 0–0.5)
EOSINOPHIL NFR BLD AUTO: 1.5 % — SIGNIFICANT CHANGE UP (ref 0–6)
GLUCOSE SERPL-MCNC: 112 MG/DL — HIGH (ref 70–99)
LYMPHOCYTES # BLD AUTO: 1.1 K/UL — SIGNIFICANT CHANGE UP (ref 1–3.3)
LYMPHOCYTES # BLD AUTO: 15.4 % — SIGNIFICANT CHANGE UP (ref 13–44)
MONOCYTES # BLD AUTO: 0.4 K/UL — SIGNIFICANT CHANGE UP (ref 0–0.9)
MONOCYTES NFR BLD AUTO: 5.4 % — SIGNIFICANT CHANGE UP (ref 2–14)
NEUTROPHILS # BLD AUTO: 5.4 K/UL — SIGNIFICANT CHANGE UP (ref 1.8–7.4)
NEUTROPHILS NFR BLD AUTO: 77.4 % — HIGH (ref 43–77)
POTASSIUM SERPL-MCNC: 3.9 MMOL/L — SIGNIFICANT CHANGE UP (ref 3.5–5.3)
POTASSIUM SERPL-SCNC: 3.9 MMOL/L — SIGNIFICANT CHANGE UP (ref 3.5–5.3)
PROT SERPL-MCNC: 7.8 G/DL — SIGNIFICANT CHANGE UP (ref 6–8.3)
SODIUM SERPL-SCNC: 138 MMOL/L — SIGNIFICANT CHANGE UP (ref 135–145)

## 2018-10-02 ENCOUNTER — FORM ENCOUNTER (OUTPATIENT)
Age: 53
End: 2018-10-02

## 2018-10-03 ENCOUNTER — APPOINTMENT (OUTPATIENT)
Dept: ULTRASOUND IMAGING | Facility: CLINIC | Age: 53
End: 2018-10-03
Payer: MEDICAID

## 2018-10-03 ENCOUNTER — OUTPATIENT (OUTPATIENT)
Dept: OUTPATIENT SERVICES | Facility: HOSPITAL | Age: 53
LOS: 1 days | End: 2018-10-03

## 2018-10-03 DIAGNOSIS — Z98.89 OTHER SPECIFIED POSTPROCEDURAL STATES: Chronic | ICD-10-CM

## 2018-10-03 DIAGNOSIS — N20.0 CALCULUS OF KIDNEY: Chronic | ICD-10-CM

## 2018-10-03 DIAGNOSIS — Z09 ENCOUNTER FOR FOLLOW-UP EXAMINATION AFTER COMPLETED TREATMENT FOR CONDITIONS OTHER THAN MALIGNANT NEOPLASM: Chronic | ICD-10-CM

## 2018-10-03 DIAGNOSIS — Z90.722 ACQUIRED ABSENCE OF OVARIES, BILATERAL: Chronic | ICD-10-CM

## 2018-10-03 DIAGNOSIS — N93.9 ABNORMAL UTERINE AND VAGINAL BLEEDING, UNSPECIFIED: ICD-10-CM

## 2018-10-03 PROCEDURE — 76830 TRANSVAGINAL US NON-OB: CPT | Mod: 26

## 2018-10-31 ENCOUNTER — APPOINTMENT (OUTPATIENT)
Dept: COLORECTAL SURGERY | Facility: CLINIC | Age: 53
End: 2018-10-31
Payer: MEDICAID

## 2018-10-31 VITALS
BODY MASS INDEX: 30.39 KG/M2 | RESPIRATION RATE: 16 BRPM | HEIGHT: 64 IN | SYSTOLIC BLOOD PRESSURE: 137 MMHG | HEART RATE: 76 BPM | OXYGEN SATURATION: 98 % | DIASTOLIC BLOOD PRESSURE: 88 MMHG | WEIGHT: 178 LBS

## 2018-10-31 VITALS
DIASTOLIC BLOOD PRESSURE: 88 MMHG | HEIGHT: 63 IN | WEIGHT: 178 LBS | HEART RATE: 76 BPM | SYSTOLIC BLOOD PRESSURE: 137 MMHG | BODY MASS INDEX: 31.54 KG/M2 | OXYGEN SATURATION: 98 % | RESPIRATION RATE: 16 BRPM

## 2018-10-31 PROCEDURE — 99213 OFFICE O/P EST LOW 20 MIN: CPT

## 2018-10-31 RX ORDER — OXYCODONE AND ACETAMINOPHEN 5; 325 MG/1; MG/1
5-325 TABLET ORAL
Qty: 40 | Refills: 0 | Status: DISCONTINUED | COMMUNITY
Start: 2017-02-13 | End: 2018-10-31

## 2018-11-29 ENCOUNTER — APPOINTMENT (OUTPATIENT)
Dept: COLORECTAL SURGERY | Facility: CLINIC | Age: 53
End: 2018-11-29
Payer: MEDICAID

## 2018-11-29 PROCEDURE — 44388 COLONOSCOPY THRU STOMA SPX: CPT

## 2018-12-05 ENCOUNTER — OUTPATIENT (OUTPATIENT)
Dept: OUTPATIENT SERVICES | Facility: HOSPITAL | Age: 53
LOS: 1 days | Discharge: HOME | End: 2018-12-05

## 2018-12-05 DIAGNOSIS — C19 MALIGNANT NEOPLASM OF RECTOSIGMOID JUNCTION: ICD-10-CM

## 2018-12-05 DIAGNOSIS — Z98.89 OTHER SPECIFIED POSTPROCEDURAL STATES: Chronic | ICD-10-CM

## 2018-12-05 DIAGNOSIS — Z09 ENCOUNTER FOR FOLLOW-UP EXAMINATION AFTER COMPLETED TREATMENT FOR CONDITIONS OTHER THAN MALIGNANT NEOPLASM: Chronic | ICD-10-CM

## 2018-12-05 DIAGNOSIS — N20.0 CALCULUS OF KIDNEY: Chronic | ICD-10-CM

## 2018-12-05 DIAGNOSIS — Z90.722 ACQUIRED ABSENCE OF OVARIES, BILATERAL: Chronic | ICD-10-CM

## 2018-12-06 ENCOUNTER — APPOINTMENT (OUTPATIENT)
Dept: HEMATOLOGY ONCOLOGY | Facility: CLINIC | Age: 53
End: 2018-12-06

## 2018-12-06 ENCOUNTER — RESULT REVIEW (OUTPATIENT)
Age: 53
End: 2018-12-06

## 2018-12-06 VITALS
TEMPERATURE: 98.2 F | OXYGEN SATURATION: 97 % | WEIGHT: 178.13 LBS | BODY MASS INDEX: 31.55 KG/M2 | RESPIRATION RATE: 16 BRPM | HEART RATE: 75 BPM | SYSTOLIC BLOOD PRESSURE: 123 MMHG | DIASTOLIC BLOOD PRESSURE: 75 MMHG

## 2018-12-06 LAB
ALBUMIN SERPL ELPH-MCNC: 4.6 G/DL — SIGNIFICANT CHANGE UP (ref 3.3–5)
ALP SERPL-CCNC: 80 U/L — SIGNIFICANT CHANGE UP (ref 40–120)
ALT FLD-CCNC: 18 U/L — SIGNIFICANT CHANGE UP (ref 10–45)
ANION GAP SERPL CALC-SCNC: 13 MMOL/L — SIGNIFICANT CHANGE UP (ref 5–17)
AST SERPL-CCNC: 18 U/L — SIGNIFICANT CHANGE UP (ref 10–40)
BILIRUB SERPL-MCNC: 0.5 MG/DL — SIGNIFICANT CHANGE UP (ref 0.2–1.2)
BUN SERPL-MCNC: 18 MG/DL — SIGNIFICANT CHANGE UP (ref 7–23)
CALCIUM SERPL-MCNC: 9.3 MG/DL — SIGNIFICANT CHANGE UP (ref 8.4–10.5)
CHLORIDE SERPL-SCNC: 103 MMOL/L — SIGNIFICANT CHANGE UP (ref 96–108)
CO2 SERPL-SCNC: 25 MMOL/L — SIGNIFICANT CHANGE UP (ref 22–31)
CREAT SERPL-MCNC: 0.66 MG/DL — SIGNIFICANT CHANGE UP (ref 0.5–1.3)
GLUCOSE SERPL-MCNC: 164 MG/DL — HIGH (ref 70–99)
POTASSIUM SERPL-MCNC: 4 MMOL/L — SIGNIFICANT CHANGE UP (ref 3.5–5.3)
POTASSIUM SERPL-SCNC: 4 MMOL/L — SIGNIFICANT CHANGE UP (ref 3.5–5.3)
PROT SERPL-MCNC: 6.9 G/DL — SIGNIFICANT CHANGE UP (ref 6–8.3)
SODIUM SERPL-SCNC: 141 MMOL/L — SIGNIFICANT CHANGE UP (ref 135–145)

## 2018-12-06 NOTE — ASSESSMENT
[FreeTextEntry1] : 54yo F with stage III rectal cancer diagnosed in 2013 status post neoadjuvant Xeloda chemotherapy and radiation, status post APR surgery, status post adjuvant and XELOX chemotherapy. Patient is now on surveillance. \par \par #Stage III Rectal Cancer\par F/u with GI for colonoscopy- CEA level 0.9 stable. Hemoglobin stable. Exam WNL. No current evidence for recurrence. \par Colonoscopy on 10/2015 WNL, next colonoscopy in 2018. Gave a referral today.  CT chest/abd/pelvis no recurrent disease in 4/2018. At this point, we are doing surveillance CT scans yearly. \par -F/u CEA \par \par #Vaginal Bleeding \par US TV ordered today and referral to GYN. 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 \par -Patient d/w Dr Orozco

## 2018-12-06 NOTE — RESULTS/DATA
[FreeTextEntry1] : CT ABDOMEN AND PELVIS IC  \par EXAM:  CT CHEST IC  \par PROCEDURE DATE:  10/26/2016  \par INTERPRETATION:  CLINICAL INFORMATION: Rectal carcinoma status post \par surgery and chemotherapy for surveillance.\par COMPARISON: Prior CTs, the most recent of which are an abdominal CT dated \par 10/27/2015 and chest, abdomen, and pelvis CT dated 4/28/2015.\par \par PROCEDURE: \par CT of the Chest, Abdomen and Pelvis was performed with intravenous \par contrast. \par Intravenous contrast: 90 ml Omnipaque 350. 10 ml discarded.\par Oral contrast: positive contrast was administered.\par Sagittal and coronal reformats were performed.\par \par FINDINGS:\par \par CHEST: \par \par LUNGS AND LARGE AIRWAYS: Patent central airways.Linear atelectasis or \par fibrosis in the right middle lobe, unchanged.\par PLEURA:No pleural effusion.\par VESSELS:Within normal limits.\par HEART:Heart size is normal.No pericardial effusion.\par MEDIASTINUM AND ALEX:No lymphadenopathy.\par CHEST WALL AND LOWER NECK: Within normal limits.\par \par ABDOMEN AND PELVIS:\par \par LIVER: Within normal limits.\par BILE DUCTS: Normal caliber.\par GALLBLADDER: Status post cholecystectomy.\par SPLEEN: Mildly enlarged, measuring 14.3 cm in length, slightly increased \par in size.\par PANCREAS: Within normal limits.\par ADRENALS: Within normal limits.\par KIDNEYS/URETERS: Kidneys enhance symmetrically bilaterally, without \par hydronephrosis. A subcentimeter hypodense lesion in the interpolar region \par of the right kidney is too small to characterize, but unchanged. 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 No bowel \par obstruction. Appendix is within normal limits. A left lower quadrant \par colostomy is again seen.\par PERITONEUM: No ascites.\par VESSELS:  Within normal limits.\par RETROPERITONEUM: No lymphadenopathy.  \par ABDOMINAL WALL: Within normal limits.\par BONES: Within normal limits.\par \par IMPRESSION: Mild splenomegaly. Otherwise, no significant change since \par 4/28/2015.\par \par EXAM:  MG MAMMO DIGITAL DX W CAD LT#  \par \par EXAM:  MG TOMOSYNTHESIS DX LT  \par \par EXAM:  US BREAST LIMITED RT  \par \par \par PROCEDURE DATE:  11/02/2016  \par \par \par INTERPRETATION:  CLINICAL INDICATION: Diagnostic mammography was \par performed for the clinical indication of  further evaluation of a 7 mm \par circumscribed nodule in the right central breast and a focal asymmetry in \par the left upper outer posterior breast. The patient reports a personal \par history of colon cancer.\par \par COMPARISON:\par No prior mammograms or breast ultrasounds are available for comparison. \par The patient reports having a prior mammogram at [facility] 5 \par years ago, however the images are not provided for comparison.\par \par TECHNIQUE:\par 1.  Spot imaging of the left breast with tomosynthesis in the CC and MLO \par projections was obtained.\par 2.  Targeted sonographic evaluation of the right breast was performed. I \par personally scanned this patient after initial evaluation by the \par technologist.  \par \par FINDINGS:\par Mammography:\par *  The previously described focal asymmetry in the upper outer posterior \par left breast is compatible with benign overlapping fibroglandular tissue \par on the additional views. No suspicious mass, calcifications, or \par architectural distortion is seen.\par \par Ultrasound: \par *  Right 6-7 o'clock, 2 cm from the nipple, 0.7 x 0.4 x 0.5 cm mildly \par hypoechoic circumscribed mass without internal vascularity. In the \par transverse imaging position, there is a suggestion of a fluid debris \par level. This corresponds with the mammographic nodule of interest.\par \par IMPRESSION: \par 1. No evidence of malignancy in the left breast.\par 2. In the right breast 6 to 7:00 position, 2 cm from the nipple, \par corresponding to the mammographic nodule of interest, there is a 0.7 cm \par circumscribed hypoechoic mass which is felt to be probably benign.\par \par RECOMMENDATION:  Ultrasound in 6 months\par \par BI-RADS 3-Probably Benign Finding(s)\par These results and recommendations were explained to the patient, who will \par also be mailed a written summary in layman's terms.\par \par CT CHEST IC  \par EXAM:  CT ABDOMEN AND PELVIS OC IC  \par PROCEDURE DATE:  05/22/2017  \par \par IMPRESSION: \par No evidence of recurrent or metastatic disease.\par \par Mammogram 1/2018\par IMPRESSION: No sonographic evidence of malignancy in the targeted right \par breast at this time.\par \par RECOMMENDATION:  Mammography in one year\par BI-RADS 2-Benign finding(s)\par These results and recommendations were explained to the patient, who will \par also be mailed a written summary in layman's terms.\par Dense breasts\par \par

## 2018-12-06 NOTE — PHYSICAL EXAM
[Fully active, able to carry on all pre-disease performance without restriction] : Status 0 - Fully active, able to carry on all pre-disease performance without restriction [Normal] : affect appropriate [de-identified] : birthmark in the leg

## 2018-12-06 NOTE — HISTORY OF PRESENT ILLNESS
[Disease: _____________________] : Disease: [unfilled] [T: ___] : T[unfilled] [N: ___] : N[unfilled] [M: ___] : M[unfilled] [___________________________________] : Drug: [unfilled] [0 - No Distress] : Distress Level: 0 [de-identified] : 52-year-old female with history of stage III rectal cancer dx 31/7/2013 status post neoadjuvant Xeloda chemotherapy and radiation, status post APR surgery, status post adjuvant and XELOX chemotherapy (6 months) who presents today for followup.  Adjuvant chemotherapy was delayed due to wound healing issues. She experienced grade 3 neuropathy during adjuvant therapy and oxaliplatin was held for the last cycle.  She presents today for follow up. \par \par 10/1/2018 Patient denies any complaints. No rectal bleeding. No vaginal bleeding or hematuria. No fever or chills. No abdominal pain. Good appetite. Last colonoscopy 10/2015 with normal limits, per GI colonoscopy in 3 years (2018). \par \par  [FreeTextEntry1] : off treatment  [de-identified] : 12/6/2018 She is complaining of vaginal bleeding during intercourse. She is noticed also discomfort in the rectal area after intercourse. \par  [de-identified] : treated with opioids

## 2018-12-18 ENCOUNTER — FORM ENCOUNTER (OUTPATIENT)
Age: 53
End: 2018-12-18

## 2018-12-19 ENCOUNTER — APPOINTMENT (OUTPATIENT)
Dept: CT IMAGING | Facility: IMAGING CENTER | Age: 53
End: 2018-12-19
Payer: COMMERCIAL

## 2018-12-19 ENCOUNTER — OUTPATIENT (OUTPATIENT)
Dept: OUTPATIENT SERVICES | Facility: HOSPITAL | Age: 53
LOS: 1 days | End: 2018-12-19
Payer: COMMERCIAL

## 2018-12-19 DIAGNOSIS — Z90.722 ACQUIRED ABSENCE OF OVARIES, BILATERAL: Chronic | ICD-10-CM

## 2018-12-19 DIAGNOSIS — Z98.89 OTHER SPECIFIED POSTPROCEDURAL STATES: Chronic | ICD-10-CM

## 2018-12-19 DIAGNOSIS — N20.0 CALCULUS OF KIDNEY: Chronic | ICD-10-CM

## 2018-12-19 DIAGNOSIS — Z85.048 PERSONAL HISTORY OF OTHER MALIGNANT NEOPLASM OF RECTUM, RECTOSIGMOID JUNCTION, AND ANUS: ICD-10-CM

## 2018-12-19 DIAGNOSIS — Z09 ENCOUNTER FOR FOLLOW-UP EXAMINATION AFTER COMPLETED TREATMENT FOR CONDITIONS OTHER THAN MALIGNANT NEOPLASM: Chronic | ICD-10-CM

## 2018-12-19 PROCEDURE — 71260 CT THORAX DX C+: CPT | Mod: 26

## 2018-12-19 PROCEDURE — 74177 CT ABD & PELVIS W/CONTRAST: CPT

## 2018-12-19 PROCEDURE — 71260 CT THORAX DX C+: CPT

## 2018-12-19 PROCEDURE — 74177 CT ABD & PELVIS W/CONTRAST: CPT | Mod: 26

## 2019-01-02 ENCOUNTER — APPOINTMENT (OUTPATIENT)
Dept: COLORECTAL SURGERY | Facility: CLINIC | Age: 54
End: 2019-01-02
Payer: COMMERCIAL

## 2019-01-02 PROCEDURE — 99213 OFFICE O/P EST LOW 20 MIN: CPT

## 2019-01-03 ENCOUNTER — RESULT REVIEW (OUTPATIENT)
Age: 54
End: 2019-01-03

## 2019-01-03 ENCOUNTER — APPOINTMENT (OUTPATIENT)
Dept: HEMATOLOGY ONCOLOGY | Facility: CLINIC | Age: 54
End: 2019-01-03

## 2019-01-03 ENCOUNTER — LABORATORY RESULT (OUTPATIENT)
Age: 54
End: 2019-01-03

## 2019-01-03 VITALS
HEART RATE: 76 BPM | SYSTOLIC BLOOD PRESSURE: 123 MMHG | BODY MASS INDEX: 31.63 KG/M2 | DIASTOLIC BLOOD PRESSURE: 73 MMHG | RESPIRATION RATE: 16 BRPM | TEMPERATURE: 98 F | WEIGHT: 178.57 LBS | OXYGEN SATURATION: 98 %

## 2019-01-03 DIAGNOSIS — R30.0 DYSURIA: ICD-10-CM

## 2019-01-03 LAB — LDH SERPL L TO P-CCNC: 159 U/L — SIGNIFICANT CHANGE UP (ref 50–242)

## 2019-01-06 LAB
APPEARANCE: ABNORMAL
BILIRUBIN URINE: NEGATIVE
BLOOD URINE: NEGATIVE
COLOR: YELLOW
GLUCOSE QUALITATIVE U: NEGATIVE MG/DL
KETONES URINE: NEGATIVE
LEUKOCYTE ESTERASE URINE: NEGATIVE
NITRITE URINE: NEGATIVE
PH URINE: 5
PROTEIN URINE: NEGATIVE MG/DL
SPECIFIC GRAVITY URINE: 1.02
UROBILINOGEN URINE: NEGATIVE MG/DL

## 2019-01-08 ENCOUNTER — OTHER (OUTPATIENT)
Age: 54
End: 2019-01-08

## 2019-01-08 NOTE — HISTORY OF PRESENT ILLNESS
[Disease: _____________________] : Disease: [unfilled] [T: ___] : T[unfilled] [N: ___] : N[unfilled] [M: ___] : M[unfilled] [___________________________________] : Drug: [unfilled] [0 - No Distress] : Distress Level: 0 [de-identified] : 52-year-old female with history of stage III rectal cancer dx 31/7/2013 status post neoadjuvant Xeloda chemotherapy and radiation, status post APR surgery, status post adjuvant and XELOX chemotherapy (6 months) who presents today for followup.  Adjuvant chemotherapy was delayed due to wound healing issues. She experienced grade 3 neuropathy during adjuvant therapy and oxaliplatin was held for the last cycle.  She presents today for follow up. \par \par 10/1/2018 Patient denies any complaints. No rectal bleeding. No vaginal bleeding or hematuria. No fever or chills. No abdominal pain. Good appetite. Last colonoscopy 10/2015 with normal limits, per GI colonoscopy in 3 years (2018). \par \par 12/6/2018 She is complaining of vaginal bleeding during intercourse. She is noticed also discomfort in the rectal area after intercourse. \par  [FreeTextEntry1] : off treatment  [de-identified] : 1/3/19 She is here for follow-up. She was complaining of rectal discomfort and gas sensation while urinating. She denies any vaginal bleeding, but the discomfort is still there. She had CT chest/abd/pelvis that was negative for any recurrent disease. She recently had Colonoscopy from her ostomy site. \par She went to see her Colorectal Surgeon but ostomy has a good function.  [de-identified] : treated with opioids

## 2019-01-08 NOTE — ASSESSMENT
[FreeTextEntry1] : 52yo F with stage III rectal cancer diagnosed in 2013 status post neoadjuvant Xeloda chemotherapy and radiation, status post APR surgery, status post adjuvant and XELOX chemotherapy. Patient is now on surveillance. \par \par #Stage III Rectal Cancer\par F/u with GI for colonoscopy- CEA level 0.9 stable. Hemoglobin stable. Exam WNL. No current evidence for recurrence. PAtient with discomfort in the rectal area, colosnoscopy and CT scans done. Colonoscopy in 10/2018 WNL. CT chest/abd/pelvis no recurrent disease in 12//2018. Unclear etiology for the rectal discomfort. She was seen by Colorectal surgery and no signs of fistula. \par -Send UA and Ucx. \par \par #Vaginal Bleeding \par US TV did not reveal the etiology of vaginal bleeding. Referral to GYN. 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 \par -Patient d/w Dr Hall

## 2019-01-08 NOTE — RESULTS/DATA
[FreeTextEntry1] : CT ABDOMEN AND PELVIS IC  \par EXAM:  CT CHEST IC  \par PROCEDURE DATE:  10/26/2016  \par INTERPRETATION:  CLINICAL INFORMATION: Rectal carcinoma status post \par surgery and chemotherapy for surveillance.\par COMPARISON: Prior CTs, the most recent of which are an abdominal CT dated \par 10/27/2015 and chest, abdomen, and pelvis CT dated 4/28/2015.\par \par PROCEDURE: \par CT of the Chest, Abdomen and Pelvis was performed with intravenous \par contrast. \par Intravenous contrast: 90 ml Omnipaque 350. 10 ml discarded.\par Oral contrast: positive contrast was administered.\par Sagittal and coronal reformats were performed.\par \par FINDINGS:\par \par CHEST: \par \par LUNGS AND LARGE AIRWAYS: Patent central airways.Linear atelectasis or \par fibrosis in the right middle lobe, unchanged.\par PLEURA:No pleural effusion.\par VESSELS:Within normal limits.\par HEART:Heart size is normal.No pericardial effusion.\par MEDIASTINUM AND ALEX:No lymphadenopathy.\par CHEST WALL AND LOWER NECK: Within normal limits.\par \par ABDOMEN AND PELVIS:\par \par LIVER: Within normal limits.\par BILE DUCTS: Normal caliber.\par GALLBLADDER: Status post cholecystectomy.\par SPLEEN: Mildly enlarged, measuring 14.3 cm in length, slightly increased \par in size.\par PANCREAS: Within normal limits.\par ADRENALS: Within normal limits.\par KIDNEYS/URETERS: Kidneys enhance symmetrically bilaterally, without \par hydronephrosis. A subcentimeter hypodense lesion in the interpolar region \par of the right kidney is too small to characterize, but unchanged. 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 No bowel \par obstruction. Appendix is within normal limits. A left lower quadrant \par colostomy is again seen.\par PERITONEUM: No ascites.\par VESSELS:  Within normal limits.\par RETROPERITONEUM: No lymphadenopathy.  \par ABDOMINAL WALL: Within normal limits.\par BONES: Within normal limits.\par \par IMPRESSION: Mild splenomegaly. Otherwise, no significant change since \par 4/28/2015.\par \par EXAM:  MG MAMMO DIGITAL DX W CAD LT#  \par \par EXAM:  MG TOMOSYNTHESIS DX LT  \par \par EXAM:  US BREAST LIMITED RT  \par \par \par PROCEDURE DATE:  11/02/2016  \par \par \par INTERPRETATION:  CLINICAL INDICATION: Diagnostic mammography was \par performed for the clinical indication of  further evaluation of a 7 mm \par circumscribed nodule in the right central breast and a focal asymmetry in \par the left upper outer posterior breast. The patient reports a personal \par history of colon cancer.\par \par COMPARISON:\par No prior mammograms or breast ultrasounds are available for comparison. \par The patient reports having a prior mammogram at [facility] 5 \par years ago, however the images are not provided for comparison.\par \par TECHNIQUE:\par 1.  Spot imaging of the left breast with tomosynthesis in the CC and MLO \par projections was obtained.\par 2.  Targeted sonographic evaluation of the right breast was performed. I \par personally scanned this patient after initial evaluation by the \par technologist.  \par \par FINDINGS:\par Mammography:\par *  The previously described focal asymmetry in the upper outer posterior \par left breast is compatible with benign overlapping fibroglandular tissue \par on the additional views. No suspicious mass, calcifications, or \par architectural distortion is seen.\par \par Ultrasound: \par *  Right 6-7 o'clock, 2 cm from the nipple, 0.7 x 0.4 x 0.5 cm mildly \par hypoechoic circumscribed mass without internal vascularity. In the \par transverse imaging position, there is a suggestion of a fluid debris \par level. This corresponds with the mammographic nodule of interest.\par \par IMPRESSION: \par 1. No evidence of malignancy in the left breast.\par 2. In the right breast 6 to 7:00 position, 2 cm from the nipple, \par corresponding to the mammographic nodule of interest, there is a 0.7 cm \par circumscribed hypoechoic mass which is felt to be probably benign.\par \par RECOMMENDATION:  Ultrasound in 6 months\par \par BI-RADS 3-Probably Benign Finding(s)\par These results and recommendations were explained to the patient, who will \par also be mailed a written summary in layman's terms.\par \par CT CHEST IC  \par EXAM:  CT ABDOMEN AND PELVIS OC IC  \par PROCEDURE DATE:  05/22/2017  \par \par IMPRESSION: \par No evidence of recurrent or metastatic disease.\par \par Mammogram 1/2018\par IMPRESSION: No sonographic evidence of malignancy in the targeted right \par breast at this time.\par \par RECOMMENDATION:  Mammography in one year\par BI-RADS 2-Benign finding(s)\par These results and recommendations were explained to the patient, who will \par also be mailed a written summary in layman's terms.\par Dense breasts\par \par

## 2019-01-08 NOTE — PHYSICAL EXAM
[Fully active, able to carry on all pre-disease performance without restriction] : Status 0 - Fully active, able to carry on all pre-disease performance without restriction [Normal] : affect appropriate [de-identified] : birthmark in the leg

## 2019-01-15 ENCOUNTER — APPOINTMENT (OUTPATIENT)
Dept: OBGYN | Facility: CLINIC | Age: 54
End: 2019-01-15

## 2019-01-29 ENCOUNTER — APPOINTMENT (OUTPATIENT)
Dept: OBGYN | Facility: CLINIC | Age: 54
End: 2019-01-29
Payer: COMMERCIAL

## 2019-01-29 ENCOUNTER — FORM ENCOUNTER (OUTPATIENT)
Age: 54
End: 2019-01-29

## 2019-01-29 VITALS
HEIGHT: 63 IN | DIASTOLIC BLOOD PRESSURE: 70 MMHG | WEIGHT: 178.19 LBS | SYSTOLIC BLOOD PRESSURE: 140 MMHG | BODY MASS INDEX: 31.57 KG/M2

## 2019-01-29 DIAGNOSIS — N95.2 POSTMENOPAUSAL ATROPHIC VAGINITIS: ICD-10-CM

## 2019-01-29 DIAGNOSIS — R92.2 INCONCLUSIVE MAMMOGRAM: ICD-10-CM

## 2019-01-29 DIAGNOSIS — Z01.419 ENCOUNTER FOR GYNECOLOGICAL EXAMINATION (GENERAL) (ROUTINE) W/OUT ABNORMAL FINDINGS: ICD-10-CM

## 2019-01-29 PROCEDURE — 99386 PREV VISIT NEW AGE 40-64: CPT

## 2019-01-29 NOTE — PHYSICAL EXAM
[Awake] : awake [Alert] : alert [Soft] : soft [Oriented x3] : oriented to person, place, and time [Normal] : uterus [No Bleeding] : there was no active vaginal bleeding [Uterine Adnexae] : were not tender and not enlarged [RRR, No Murmurs] : RRR, no murmurs [CTAB] : CTAB [Acute Distress] : no acute distress [Goiter] : no goiter [Mass] : no breast mass [Nipple Discharge] : no nipple discharge [Axillary LAD] : no axillary lymphadenopathy [Tender] : non tender [Distended] : not distended [Depressed Mood] : not depressed [Flat Affect] : affect not flat [Adnexa Tenderness] : were not tender [Ovarian Mass (___ Cm)] : there were no adnexal masses

## 2019-01-30 ENCOUNTER — APPOINTMENT (OUTPATIENT)
Dept: MRI IMAGING | Facility: IMAGING CENTER | Age: 54
End: 2019-01-30
Payer: COMMERCIAL

## 2019-01-30 ENCOUNTER — OUTPATIENT (OUTPATIENT)
Dept: OUTPATIENT SERVICES | Facility: HOSPITAL | Age: 54
LOS: 1 days | End: 2019-01-30
Payer: COMMERCIAL

## 2019-01-30 DIAGNOSIS — Z98.89 OTHER SPECIFIED POSTPROCEDURAL STATES: Chronic | ICD-10-CM

## 2019-01-30 DIAGNOSIS — Z85.048 PERSONAL HISTORY OF OTHER MALIGNANT NEOPLASM OF RECTUM, RECTOSIGMOID JUNCTION, AND ANUS: ICD-10-CM

## 2019-01-30 DIAGNOSIS — N20.0 CALCULUS OF KIDNEY: Chronic | ICD-10-CM

## 2019-01-30 DIAGNOSIS — Z09 ENCOUNTER FOR FOLLOW-UP EXAMINATION AFTER COMPLETED TREATMENT FOR CONDITIONS OTHER THAN MALIGNANT NEOPLASM: Chronic | ICD-10-CM

## 2019-01-30 DIAGNOSIS — Z90.722 ACQUIRED ABSENCE OF OVARIES, BILATERAL: Chronic | ICD-10-CM

## 2019-01-30 LAB — HPV HIGH+LOW RISK DNA PNL CVX: NOT DETECTED

## 2019-01-30 PROCEDURE — 72197 MRI PELVIS W/O & W/DYE: CPT

## 2019-01-30 PROCEDURE — 72197 MRI PELVIS W/O & W/DYE: CPT | Mod: 26

## 2019-01-30 PROCEDURE — A9585: CPT

## 2019-02-04 ENCOUNTER — APPOINTMENT (OUTPATIENT)
Dept: UROLOGY | Facility: HOSPITAL | Age: 54
End: 2019-02-04

## 2019-02-04 LAB — CYTOLOGY CVX/VAG DOC THIN PREP: NORMAL

## 2019-02-14 ENCOUNTER — APPOINTMENT (OUTPATIENT)
Dept: HEMATOLOGY ONCOLOGY | Facility: CLINIC | Age: 54
End: 2019-02-14

## 2019-03-12 ENCOUNTER — FORM ENCOUNTER (OUTPATIENT)
Age: 54
End: 2019-03-12

## 2019-03-13 ENCOUNTER — APPOINTMENT (OUTPATIENT)
Dept: ULTRASOUND IMAGING | Facility: IMAGING CENTER | Age: 54
End: 2019-03-13
Payer: COMMERCIAL

## 2019-03-13 ENCOUNTER — OUTPATIENT (OUTPATIENT)
Dept: OUTPATIENT SERVICES | Facility: HOSPITAL | Age: 54
LOS: 1 days | End: 2019-03-13
Payer: COMMERCIAL

## 2019-03-13 ENCOUNTER — APPOINTMENT (OUTPATIENT)
Dept: UROGYNECOLOGY | Facility: CLINIC | Age: 54
End: 2019-03-13
Payer: COMMERCIAL

## 2019-03-13 ENCOUNTER — APPOINTMENT (OUTPATIENT)
Dept: MAMMOGRAPHY | Facility: IMAGING CENTER | Age: 54
End: 2019-03-13
Payer: COMMERCIAL

## 2019-03-13 VITALS
SYSTOLIC BLOOD PRESSURE: 128 MMHG | WEIGHT: 178 LBS | HEART RATE: 78 BPM | BODY MASS INDEX: 31.54 KG/M2 | DIASTOLIC BLOOD PRESSURE: 80 MMHG | HEIGHT: 63 IN

## 2019-03-13 DIAGNOSIS — Z98.89 OTHER SPECIFIED POSTPROCEDURAL STATES: Chronic | ICD-10-CM

## 2019-03-13 DIAGNOSIS — N20.0 CALCULUS OF KIDNEY: Chronic | ICD-10-CM

## 2019-03-13 DIAGNOSIS — Z01.419 ENCOUNTER FOR GYNECOLOGICAL EXAMINATION (GENERAL) (ROUTINE) WITHOUT ABNORMAL FINDINGS: ICD-10-CM

## 2019-03-13 DIAGNOSIS — N95.2 POSTMENOPAUSAL ATROPHIC VAGINITIS: ICD-10-CM

## 2019-03-13 DIAGNOSIS — R92.2 INCONCLUSIVE MAMMOGRAM: ICD-10-CM

## 2019-03-13 DIAGNOSIS — Z09 ENCOUNTER FOR FOLLOW-UP EXAMINATION AFTER COMPLETED TREATMENT FOR CONDITIONS OTHER THAN MALIGNANT NEOPLASM: Chronic | ICD-10-CM

## 2019-03-13 DIAGNOSIS — Z90.722 ACQUIRED ABSENCE OF OVARIES, BILATERAL: Chronic | ICD-10-CM

## 2019-03-13 DIAGNOSIS — Z82.49 FAMILY HISTORY OF ISCHEMIC HEART DISEASE AND OTHER DISEASES OF THE CIRCULATORY SYSTEM: ICD-10-CM

## 2019-03-13 PROCEDURE — 77063 BREAST TOMOSYNTHESIS BI: CPT | Mod: 26

## 2019-03-13 PROCEDURE — 77063 BREAST TOMOSYNTHESIS BI: CPT

## 2019-03-13 PROCEDURE — 77067 SCR MAMMO BI INCL CAD: CPT | Mod: 26

## 2019-03-13 PROCEDURE — 77067 SCR MAMMO BI INCL CAD: CPT

## 2019-03-13 PROCEDURE — ZZZZZ: CPT

## 2019-03-13 PROCEDURE — 76641 ULTRASOUND BREAST COMPLETE: CPT | Mod: 26,50

## 2019-03-13 PROCEDURE — 76641 ULTRASOUND BREAST COMPLETE: CPT

## 2019-03-13 PROCEDURE — 99203 OFFICE O/P NEW LOW 30 MIN: CPT

## 2019-03-13 PROCEDURE — 99204 OFFICE O/P NEW MOD 45 MIN: CPT

## 2019-03-13 NOTE — DISCUSSION/SUMMARY
[Discuss Alternatives/Risks/Benefits w/Patient] : All alternatives, risks, and benefits were discussed with the patient/family and all questions were answered.  Patient expressed good understanding and appreciates the importance of follow up as recommended. [Reviewed Radiology Report(s)] : Radiology reports were reviewed. [FreeTextEntry1] : -No abnormal findings on today's exam\par -Recommended f/u with colorectal surgery. She had a recent MRI with IV contrast, but not with PO contrast. Will f/u with colorectal regarding possible need for imaging to assess for colovaginal fistula. Although not appreciated on exam today\par -Continue follow with Dr. Lira for vaginal bleeding \par -Return as needed for urogyn issues/concerns\par All questions answered

## 2019-03-13 NOTE — HISTORY OF PRESENT ILLNESS
[Cystocele (Obstetric)] : none [Vaginal Wall Prolapse] : rare [Rectal Prolapse] : none [Unable To Restrain Bowel Movement] : none [Urinary Frequency] : none [Feelings Of Urinary Urgency] : none [Urinary Frequency More Than Twice At Night (Nocturia)] : none [Urinary Tract Infection] : none [Pelvic Pain] : none [Rectal Pain] : none [FreeTextEntry4] : 1x following intercourse - followed by gyn [de-identified] : colostomy [FreeTextEntry1] : \par 54yo referred for possible fistula. Has a history of rectal cancer s/p colectomy and end colostomy and bilateral oophorectomy in 2014 f/b chemoradiation. She was seen by her Gyn on 1/29/19 and referred for possible fistula. Pt reports feeling "air trapped" in the vagina that is expressed when she "pushes" while sitting on the toilet. Denies leakage of stool through the vagina. Also denies any urinary leakage or incontinence. No prolapse symptoms. \par \par MRI with IV contrast on 1/30/19 did not show any abnormal findings.

## 2019-03-13 NOTE — PHYSICAL EXAM
[No Acute Distress] : in no acute distress [Well developed] : well developed [Good Hygeine] : demonstrates good hygeine [Oriented x3] : oriented to person, place, and time [Normal Mood/Affect] : mood and affect are normal [Bulbocavernous] : bulbocavernous was present [Anal Reflex] : the anal reflex was present [Warm and Dry] : was warm and dry to touch [Normal Gait] : gait was normal [Labia Majora] : were normal [Labia Minora] : were normal [Normal Appearance] : general appearance was normal [Atrophy] : atrophy [No Bleeding] : there was no active vaginal bleeding [2] : 2 [Normal] : no abnormalities [Dry Mucosa] : dry mucosa [Anxiety] : patient is not anxious [Tenderness] : ~T no ~M abdominal tenderness observed [Distended] : not distended [H/Smegaly] : no hepatosplenomegaly [Inguinal LAD] : no adenopathy was noted in the inguinal lymph nodes [FreeTextEntry4] : shortened vagina c/w prior radiation therapy, no evidence of fistula seen on exam [de-identified] : no prolapse [de-identified] : N/A

## 2019-03-13 NOTE — REASON FOR VISIT
[Initial Visit ___] : [unfilled] is here today for an initial visit  for [unfilled] [[employer] ] : provided by [employer]   [FreeTextEntry1] : [id number] [FreeTextEntry2] : Misael

## 2019-03-27 ENCOUNTER — OUTPATIENT (OUTPATIENT)
Dept: OUTPATIENT SERVICES | Facility: HOSPITAL | Age: 54
LOS: 1 days | Discharge: HOME | End: 2019-03-27

## 2019-03-27 DIAGNOSIS — Z09 ENCOUNTER FOR FOLLOW-UP EXAMINATION AFTER COMPLETED TREATMENT FOR CONDITIONS OTHER THAN MALIGNANT NEOPLASM: Chronic | ICD-10-CM

## 2019-03-27 DIAGNOSIS — C19 MALIGNANT NEOPLASM OF RECTOSIGMOID JUNCTION: ICD-10-CM

## 2019-03-27 DIAGNOSIS — Z98.89 OTHER SPECIFIED POSTPROCEDURAL STATES: Chronic | ICD-10-CM

## 2019-03-27 DIAGNOSIS — N20.0 CALCULUS OF KIDNEY: Chronic | ICD-10-CM

## 2019-03-27 DIAGNOSIS — Z90.722 ACQUIRED ABSENCE OF OVARIES, BILATERAL: Chronic | ICD-10-CM

## 2019-04-04 ENCOUNTER — APPOINTMENT (OUTPATIENT)
Dept: HEMATOLOGY ONCOLOGY | Facility: CLINIC | Age: 54
End: 2019-04-04

## 2019-04-04 VITALS
BODY MASS INDEX: 31.44 KG/M2 | DIASTOLIC BLOOD PRESSURE: 82 MMHG | RESPIRATION RATE: 16 BRPM | WEIGHT: 177.47 LBS | HEART RATE: 70 BPM | SYSTOLIC BLOOD PRESSURE: 135 MMHG | TEMPERATURE: 97.8 F | OXYGEN SATURATION: 96 %

## 2019-07-01 ENCOUNTER — APPOINTMENT (OUTPATIENT)
Dept: HEMATOLOGY ONCOLOGY | Facility: CLINIC | Age: 54
End: 2019-07-01

## 2019-07-01 NOTE — HISTORY OF PRESENT ILLNESS
[Disease: _____________________] : Disease: [unfilled] [T: ___] : T[unfilled] [N: ___] : N[unfilled] [M: ___] : M[unfilled] [___________________________________] : Drug: [unfilled] [0 - No Distress] : Distress Level: 0 [de-identified] : 52-year-old female with history of stage III rectal cancer dx 31/7/2013 status post neoadjuvant Xeloda chemotherapy and radiation, status post APR surgery, status post adjuvant and XELOX chemotherapy (6 months) who presents today for followup.  Adjuvant chemotherapy was delayed due to wound healing issues. She experienced grade 3 neuropathy during adjuvant therapy and oxaliplatin was held for the last cycle.  She presents today for follow up. \par \par 10/1/2018 Patient denies any complaints. No rectal bleeding. No vaginal bleeding or hematuria. No fever or chills. No abdominal pain. Good appetite. Last colonoscopy 10/2015 with normal limits, per GI colonoscopy in 3 years (2018). \par \par 12/6/2018 She is complaining of vaginal bleeding during intercourse. She is noticed also discomfort in the rectal area after intercourse. \par \par 1/3/19 She is here for follow-up. She was complaining of rectal discomfort and gas sensation while urinating. She denies any vaginal bleeding, but the discomfort is still there. She had CT chest/abd/pelvis that was negative for any recurrent disease. She recently had Colonoscopy from her ostomy site. \par She went to see her Colorectal Surgeon but ostomy has a good function.  [FreeTextEntry1] : off treatment  [de-identified] : 4/4/2019: She also reports having air escape each time she urinates. MRI pelvic with contrast did not show evidence of recurrence disease. No changes in stool caliber. No weight loss. Good appetite.  [de-identified] : treated with opioids

## 2019-07-01 NOTE — RESULTS/DATA
[FreeTextEntry1] : CT ABDOMEN AND PELVIS IC  \par EXAM:  CT CHEST IC  \par PROCEDURE DATE:  10/26/2016  \par INTERPRETATION:  CLINICAL INFORMATION: Rectal carcinoma status post \par surgery and chemotherapy for surveillance.\par COMPARISON: Prior CTs, the most recent of which are an abdominal CT dated \par 10/27/2015 and chest, abdomen, and pelvis CT dated 4/28/2015.\par \par PROCEDURE: \par CT of the Chest, Abdomen and Pelvis was performed with intravenous \par contrast. \par Intravenous contrast: 90 ml Omnipaque 350. 10 ml discarded.\par Oral contrast: positive contrast was administered.\par Sagittal and coronal reformats were performed.\par \par FINDINGS:\par \par CHEST: \par \par LUNGS AND LARGE AIRWAYS: Patent central airways.Linear atelectasis or \par fibrosis in the right middle lobe, unchanged.\par PLEURA:No pleural effusion.\par VESSELS:Within normal limits.\par HEART:Heart size is normal.No pericardial effusion.\par MEDIASTINUM AND ALEX:No lymphadenopathy.\par CHEST WALL AND LOWER NECK: Within normal limits.\par \par ABDOMEN AND PELVIS:\par \par LIVER: Within normal limits.\par BILE DUCTS: Normal caliber.\par GALLBLADDER: Status post cholecystectomy.\par SPLEEN: Mildly enlarged, measuring 14.3 cm in length, slightly increased \par in size.\par PANCREAS: Within normal limits.\par ADRENALS: Within normal limits.\par KIDNEYS/URETERS: Kidneys enhance symmetrically bilaterally, without \par hydronephrosis. A subcentimeter hypodense lesion in the interpolar region \par of the right kidney is too small to characterize, but unchanged. 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 No bowel \par obstruction. Appendix is within normal limits. A left lower quadrant \par colostomy is again seen.\par PERITONEUM: No ascites.\par VESSELS:  Within normal limits.\par RETROPERITONEUM: No lymphadenopathy.  \par ABDOMINAL WALL: Within normal limits.\par BONES: Within normal limits.\par \par IMPRESSION: Mild splenomegaly. Otherwise, no significant change since \par 4/28/2015.\par \par EXAM:  MG MAMMO DIGITAL DX W CAD LT#  \par \par EXAM:  MG TOMOSYNTHESIS DX LT  \par \par EXAM:  US BREAST LIMITED RT  \par \par \par PROCEDURE DATE:  11/02/2016  \par \par \par INTERPRETATION:  CLINICAL INDICATION: Diagnostic mammography was \par performed for the clinical indication of  further evaluation of a 7 mm \par circumscribed nodule in the right central breast and a focal asymmetry in \par the left upper outer posterior breast. The patient reports a personal \par history of colon cancer.\par \par COMPARISON:\par No prior mammograms or breast ultrasounds are available for comparison. \par The patient reports having a prior mammogram at [facility] 5 \par years ago, however the images are not provided for comparison.\par \par TECHNIQUE:\par 1.  Spot imaging of the left breast with tomosynthesis in the CC and MLO \par projections was obtained.\par 2.  Targeted sonographic evaluation of the right breast was performed. I \par personally scanned this patient after initial evaluation by the \par technologist.  \par \par FINDINGS:\par Mammography:\par *  The previously described focal asymmetry in the upper outer posterior \par left breast is compatible with benign overlapping fibroglandular tissue \par on the additional views. No suspicious mass, calcifications, or \par architectural distortion is seen.\par \par Ultrasound: \par *  Right 6-7 o'clock, 2 cm from the nipple, 0.7 x 0.4 x 0.5 cm mildly \par hypoechoic circumscribed mass without internal vascularity. In the \par transverse imaging position, there is a suggestion of a fluid debris \par level. This corresponds with the mammographic nodule of interest.\par \par IMPRESSION: \par 1. No evidence of malignancy in the left breast.\par 2. In the right breast 6 to 7:00 position, 2 cm from the nipple, \par corresponding to the mammographic nodule of interest, there is a 0.7 cm \par circumscribed hypoechoic mass which is felt to be probably benign.\par \par RECOMMENDATION:  Ultrasound in 6 months\par \par BI-RADS 3-Probably Benign Finding(s)\par These results and recommendations were explained to the patient, who will \par also be mailed a written summary in layman's terms.\par \par CT CHEST IC  \par EXAM:  CT ABDOMEN AND PELVIS OC IC  \par PROCEDURE DATE:  05/22/2017  \par \par IMPRESSION: \par No evidence of recurrent or metastatic disease.\par \par Mammogram 1/2018\par IMPRESSION: No sonographic evidence of malignancy in the targeted right \par breast at this time.\par \par RECOMMENDATION:  Mammography in one year\par BI-RADS 2-Benign finding(s)\par These results and recommendations were explained to the patient, who will \par also be mailed a written summary in layman's terms.\par Dense breasts\par \par

## 2019-07-01 NOTE — ASSESSMENT
[FreeTextEntry1] : 54yo F with stage III rectal cancer diagnosed in 2013 status post neoadjuvant Xeloda chemotherapy and radiation, status post APR surgery, status post adjuvant and XELOX chemotherapy. Patient is now on surveillance. \par \par #Stage III Rectal Cancer\par F/u with GI for colonoscopy- CEA level 0.9 stable. Hemoglobin stable. Exam WNL. No current evidence for recurrence. PAtient with discomfort in the rectal area, colosnoscopy and CT scans done. Colonoscopy in 10/2018 WNL. CT chest/abd/pelvis no recurrent disease in 12//2018. Unclear etiology for the rectal discomfort. She was seen by Colorectal surgery and no signs of fistula. \par \par #Vaginal Bleeding \par US TV did not reveal the etiology of vaginal bleeding. Referral to GYN. 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 \par -Patient d/w Dr Hall

## 2019-07-01 NOTE — PHYSICAL EXAM
[Fully active, able to carry on all pre-disease performance without restriction] : Status 0 - Fully active, able to carry on all pre-disease performance without restriction [Normal] : affect appropriate [de-identified] : birthmark in the leg

## 2019-07-18 ENCOUNTER — OUTPATIENT (OUTPATIENT)
Dept: OUTPATIENT SERVICES | Facility: HOSPITAL | Age: 54
LOS: 1 days | Discharge: HOME | End: 2019-07-18

## 2019-07-18 DIAGNOSIS — N20.0 CALCULUS OF KIDNEY: Chronic | ICD-10-CM

## 2019-07-18 DIAGNOSIS — C19 MALIGNANT NEOPLASM OF RECTOSIGMOID JUNCTION: ICD-10-CM

## 2019-07-18 DIAGNOSIS — Z98.89 OTHER SPECIFIED POSTPROCEDURAL STATES: Chronic | ICD-10-CM

## 2019-07-18 DIAGNOSIS — Z90.722 ACQUIRED ABSENCE OF OVARIES, BILATERAL: Chronic | ICD-10-CM

## 2019-07-18 DIAGNOSIS — Z09 ENCOUNTER FOR FOLLOW-UP EXAMINATION AFTER COMPLETED TREATMENT FOR CONDITIONS OTHER THAN MALIGNANT NEOPLASM: Chronic | ICD-10-CM

## 2019-07-24 ENCOUNTER — RESULT REVIEW (OUTPATIENT)
Age: 54
End: 2019-07-24

## 2019-07-24 ENCOUNTER — APPOINTMENT (OUTPATIENT)
Dept: HEMATOLOGY ONCOLOGY | Facility: CLINIC | Age: 54
End: 2019-07-24
Payer: COMMERCIAL

## 2019-07-24 VITALS
DIASTOLIC BLOOD PRESSURE: 80 MMHG | OXYGEN SATURATION: 98 % | TEMPERATURE: 98 F | SYSTOLIC BLOOD PRESSURE: 124 MMHG | HEART RATE: 66 BPM | RESPIRATION RATE: 16 BRPM | BODY MASS INDEX: 31.25 KG/M2 | WEIGHT: 176.37 LBS | HEIGHT: 62.99 IN

## 2019-07-24 LAB
ALBUMIN SERPL ELPH-MCNC: 4.8 G/DL
ALP BLD-CCNC: 86 U/L
ALT SERPL-CCNC: 20 U/L
ANION GAP SERPL CALC-SCNC: 16 MMOL/L
AST SERPL-CCNC: 23 U/L
BASOPHILS # BLD AUTO: 0 K/UL — SIGNIFICANT CHANGE UP (ref 0–0.2)
BASOPHILS NFR BLD AUTO: 0.8 % — SIGNIFICANT CHANGE UP (ref 0–2)
BILIRUB SERPL-MCNC: 0.6 MG/DL
BUN SERPL-MCNC: 17 MG/DL
CALCIUM SERPL-MCNC: 9.5 MG/DL
CEA SERPL-MCNC: 0.6 NG/ML
CHLORIDE SERPL-SCNC: 103 MMOL/L
CO2 SERPL-SCNC: 22 MMOL/L
CREAT SERPL-MCNC: 0.63 MG/DL
EOSINOPHIL # BLD AUTO: 0.1 K/UL — SIGNIFICANT CHANGE UP (ref 0–0.5)
EOSINOPHIL NFR BLD AUTO: 2 % — SIGNIFICANT CHANGE UP (ref 0–6)
GLUCOSE SERPL-MCNC: 94 MG/DL
HCT VFR BLD CALC: 37.8 % — SIGNIFICANT CHANGE UP (ref 34.5–45)
HGB BLD-MCNC: 13.1 G/DL — SIGNIFICANT CHANGE UP (ref 11.5–15.5)
LYMPHOCYTES # BLD AUTO: 1.2 K/UL — SIGNIFICANT CHANGE UP (ref 1–3.3)
LYMPHOCYTES # BLD AUTO: 20.8 % — SIGNIFICANT CHANGE UP (ref 13–44)
MCHC RBC-ENTMCNC: 30.6 PG — SIGNIFICANT CHANGE UP (ref 27–34)
MCHC RBC-ENTMCNC: 34.7 G/DL — SIGNIFICANT CHANGE UP (ref 32–36)
MCV RBC AUTO: 88.2 FL — SIGNIFICANT CHANGE UP (ref 80–100)
MONOCYTES # BLD AUTO: 0.4 K/UL — SIGNIFICANT CHANGE UP (ref 0–0.9)
MONOCYTES NFR BLD AUTO: 6.1 % — SIGNIFICANT CHANGE UP (ref 2–14)
NEUTROPHILS # BLD AUTO: 4.1 K/UL — SIGNIFICANT CHANGE UP (ref 1.8–7.4)
NEUTROPHILS NFR BLD AUTO: 70.4 % — SIGNIFICANT CHANGE UP (ref 43–77)
PLATELET # BLD AUTO: 208 K/UL — SIGNIFICANT CHANGE UP (ref 150–400)
POTASSIUM SERPL-SCNC: 4.2 MMOL/L
PROT SERPL-MCNC: 7.6 G/DL
RBC # BLD: 4.28 M/UL — SIGNIFICANT CHANGE UP (ref 3.8–5.2)
RBC # FLD: 12.2 % — SIGNIFICANT CHANGE UP (ref 10.3–14.5)
SODIUM SERPL-SCNC: 141 MMOL/L
WBC # BLD: 5.8 K/UL — SIGNIFICANT CHANGE UP (ref 3.8–10.5)
WBC # FLD AUTO: 5.8 K/UL — SIGNIFICANT CHANGE UP (ref 3.8–10.5)

## 2019-07-24 PROCEDURE — 99215 OFFICE O/P EST HI 40 MIN: CPT

## 2019-07-24 NOTE — PHYSICAL EXAM
[Fully active, able to carry on all pre-disease performance without restriction] : Status 0 - Fully active, able to carry on all pre-disease performance without restriction [Normal] : affect appropriate [de-identified] : colostomy bag in place [de-identified] : birthmark in the leg

## 2019-07-24 NOTE — RESULTS/DATA
[FreeTextEntry1] : CT ABDOMEN AND PELVIS IC  \par EXAM:  CT CHEST IC  \par PROCEDURE DATE:  10/26/2016  \par INTERPRETATION:  CLINICAL INFORMATION: Rectal carcinoma status post \par surgery and chemotherapy for surveillance.\par COMPARISON: Prior CTs, the most recent of which are an abdominal CT dated \par 10/27/2015 and chest, abdomen, and pelvis CT dated 4/28/2015.\par \par PROCEDURE: \par CT of the Chest, Abdomen and Pelvis was performed with intravenous \par contrast. \par Intravenous contrast: 90 ml Omnipaque 350. 10 ml discarded.\par Oral contrast: positive contrast was administered.\par Sagittal and coronal reformats were performed.\par \par FINDINGS:\par \par CHEST: \par \par LUNGS AND LARGE AIRWAYS: Patent central airways.Linear atelectasis or \par fibrosis in the right middle lobe, unchanged.\par PLEURA:No pleural effusion.\par VESSELS:Within normal limits.\par HEART:Heart size is normal.No pericardial effusion.\par MEDIASTINUM AND ALEX:No lymphadenopathy.\par CHEST WALL AND LOWER NECK: Within normal limits.\par \par ABDOMEN AND PELVIS:\par \par LIVER: Within normal limits.\par BILE DUCTS: Normal caliber.\par GALLBLADDER: Status post cholecystectomy.\par SPLEEN: Mildly enlarged, measuring 14.3 cm in length, slightly increased \par in size.\par PANCREAS: Within normal limits.\par ADRENALS: Within normal limits.\par KIDNEYS/URETERS: Kidneys enhance symmetrically bilaterally, without \par hydronephrosis. A subcentimeter hypodense lesion in the interpolar region \par of the right kidney is too small to characterize, but unchanged. 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 No bowel \par obstruction. Appendix is within normal limits. A left lower quadrant \par colostomy is again seen.\par PERITONEUM: No ascites.\par VESSELS:  Within normal limits.\par RETROPERITONEUM: No lymphadenopathy.  \par ABDOMINAL WALL: Within normal limits.\par BONES: Within normal limits.\par \par IMPRESSION: Mild splenomegaly. Otherwise, no significant change since \par 4/28/2015.\par \par EXAM:  MG MAMMO DIGITAL DX W CAD LT#  \par \par EXAM:  MG TOMOSYNTHESIS DX LT  \par \par EXAM:  US BREAST LIMITED RT  \par \par \par PROCEDURE DATE:  11/02/2016  \par \par \par INTERPRETATION:  CLINICAL INDICATION: Diagnostic mammography was \par performed for the clinical indication of  further evaluation of a 7 mm \par circumscribed nodule in the right central breast and a focal asymmetry in \par the left upper outer posterior breast. The patient reports a personal \par history of colon cancer.\par \par COMPARISON:\par No prior mammograms or breast ultrasounds are available for comparison. \par The patient reports having a prior mammogram at [facility] 5 \par years ago, however the images are not provided for comparison.\par \par TECHNIQUE:\par 1.  Spot imaging of the left breast with tomosynthesis in the CC and MLO \par projections was obtained.\par 2.  Targeted sonographic evaluation of the right breast was performed. I \par personally scanned this patient after initial evaluation by the \par technologist.  \par \par FINDINGS:\par Mammography:\par *  The previously described focal asymmetry in the upper outer posterior \par left breast is compatible with benign overlapping fibroglandular tissue \par on the additional views. No suspicious mass, calcifications, or \par architectural distortion is seen.\par \par Ultrasound: \par *  Right 6-7 o'clock, 2 cm from the nipple, 0.7 x 0.4 x 0.5 cm mildly \par hypoechoic circumscribed mass without internal vascularity. In the \par transverse imaging position, there is a suggestion of a fluid debris \par level. This corresponds with the mammographic nodule of interest.\par \par IMPRESSION: \par 1. No evidence of malignancy in the left breast.\par 2. In the right breast 6 to 7:00 position, 2 cm from the nipple, \par corresponding to the mammographic nodule of interest, there is a 0.7 cm \par circumscribed hypoechoic mass which is felt to be probably benign.\par \par RECOMMENDATION:  Ultrasound in 6 months\par \par BI-RADS 3-Probably Benign Finding(s)\par These results and recommendations were explained to the patient, who will \par also be mailed a written summary in layman's terms.\par \par CT CHEST IC  \par EXAM:  CT ABDOMEN AND PELVIS OC IC  \par PROCEDURE DATE:  05/22/2017  \par \par IMPRESSION: \par No evidence of recurrent or metastatic disease.\par \par Mammogram 1/2018\par IMPRESSION: No sonographic evidence of malignancy in the targeted right \par breast at this time.\par \par RECOMMENDATION:  Mammography in one year\par BI-RADS 2-Benign finding(s)\par These results and recommendations were explained to the patient, who will \par also be mailed a written summary in layman's terms.\par Dense breasts\par \par
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
## 2019-07-24 NOTE — ASSESSMENT
[Supportive] : Goals of care discussed with patient: Supportive [FreeTextEntry1] : 55yo F with stage III rectal cancer diagnosed in 2013 status post neoadjuvant Xeloda chemotherapy and radiation, status post APR surgery, status post adjuvant and XELOX chemotherapy. Patient is now on surveillance 6 years from diagnosis. CT scanning most recently in 12/2018 documents no evidence of recurrence of disease. \par \par #Stage III Rectal Cancer\par F/u with GI for colonoscopy- CEA level 0.9 stable. Hemoglobin stable. Exam WNL. No current evidence for recurrence. PAtient with discomfort in the rectal area, colonoscopy and CT scans done. Colonoscopy in 10/2018 WNL. CT chest/abd/pelvis no recurrent disease in 12//2018. Unclear etiology for the rectal discomfort. She was seen by Colorectal surgery and no signs of fistula. No signs of fistula noted on physical examination 07/24/2019. Patient given follow up number to Dr Mike Koo of colon rectal surgery. Further colostomy supplies to be ordered by surgery. We have given prescriptions for one box of supplies of colostomy bag, Calhoun paste and skin protectant. CT scan to be obtained in December 2019\par \par #Vaginal Bleeding \par US TV did not reveal the etiology of vaginal bleeding. 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 Patient seen with SIS HAAS

## 2020-01-09 ENCOUNTER — FORM ENCOUNTER (OUTPATIENT)
Age: 55
End: 2020-01-09

## 2020-01-10 ENCOUNTER — APPOINTMENT (OUTPATIENT)
Dept: CT IMAGING | Facility: IMAGING CENTER | Age: 55
End: 2020-01-10
Payer: MEDICAID

## 2020-01-10 ENCOUNTER — OUTPATIENT (OUTPATIENT)
Dept: OUTPATIENT SERVICES | Facility: HOSPITAL | Age: 55
LOS: 1 days | End: 2020-01-10
Payer: COMMERCIAL

## 2020-01-10 DIAGNOSIS — Z98.89 OTHER SPECIFIED POSTPROCEDURAL STATES: Chronic | ICD-10-CM

## 2020-01-10 DIAGNOSIS — N20.0 CALCULUS OF KIDNEY: Chronic | ICD-10-CM

## 2020-01-10 DIAGNOSIS — Z90.722 ACQUIRED ABSENCE OF OVARIES, BILATERAL: Chronic | ICD-10-CM

## 2020-01-10 DIAGNOSIS — Z09 ENCOUNTER FOR FOLLOW-UP EXAMINATION AFTER COMPLETED TREATMENT FOR CONDITIONS OTHER THAN MALIGNANT NEOPLASM: Chronic | ICD-10-CM

## 2020-01-10 DIAGNOSIS — Z85.048 PERSONAL HISTORY OF OTHER MALIGNANT NEOPLASM OF RECTUM, RECTOSIGMOID JUNCTION, AND ANUS: ICD-10-CM

## 2020-01-10 PROCEDURE — 71260 CT THORAX DX C+: CPT

## 2020-01-10 PROCEDURE — 74177 CT ABD & PELVIS W/CONTRAST: CPT

## 2020-01-10 PROCEDURE — 71260 CT THORAX DX C+: CPT | Mod: 26

## 2020-01-10 PROCEDURE — 74177 CT ABD & PELVIS W/CONTRAST: CPT | Mod: 26

## 2020-06-25 ENCOUNTER — APPOINTMENT (OUTPATIENT)
Dept: OBGYN | Facility: CLINIC | Age: 55
End: 2020-06-25

## 2020-11-16 ENCOUNTER — OUTPATIENT (OUTPATIENT)
Dept: OUTPATIENT SERVICES | Facility: HOSPITAL | Age: 55
LOS: 1 days | Discharge: HOME | End: 2020-11-16

## 2020-11-16 DIAGNOSIS — Z09 ENCOUNTER FOR FOLLOW-UP EXAMINATION AFTER COMPLETED TREATMENT FOR CONDITIONS OTHER THAN MALIGNANT NEOPLASM: Chronic | ICD-10-CM

## 2020-11-16 DIAGNOSIS — Z98.89 OTHER SPECIFIED POSTPROCEDURAL STATES: Chronic | ICD-10-CM

## 2020-11-16 DIAGNOSIS — C19 MALIGNANT NEOPLASM OF RECTOSIGMOID JUNCTION: ICD-10-CM

## 2020-11-16 DIAGNOSIS — Z90.722 ACQUIRED ABSENCE OF OVARIES, BILATERAL: Chronic | ICD-10-CM

## 2020-11-16 DIAGNOSIS — N20.0 CALCULUS OF KIDNEY: Chronic | ICD-10-CM

## 2020-11-19 ENCOUNTER — APPOINTMENT (OUTPATIENT)
Dept: HEMATOLOGY ONCOLOGY | Facility: CLINIC | Age: 55
End: 2020-11-19
Payer: COMMERCIAL

## 2020-11-19 ENCOUNTER — RESULT REVIEW (OUTPATIENT)
Age: 55
End: 2020-11-19

## 2020-11-19 VITALS
TEMPERATURE: 97.5 F | OXYGEN SATURATION: 95 % | DIASTOLIC BLOOD PRESSURE: 68 MMHG | SYSTOLIC BLOOD PRESSURE: 109 MMHG | HEART RATE: 73 BPM | RESPIRATION RATE: 16 BRPM

## 2020-11-19 LAB
BASOPHILS # BLD AUTO: 0.03 K/UL — SIGNIFICANT CHANGE UP (ref 0–0.2)
BASOPHILS NFR BLD AUTO: 0.6 % — SIGNIFICANT CHANGE UP (ref 0–2)
EOSINOPHIL # BLD AUTO: 0.05 K/UL — SIGNIFICANT CHANGE UP (ref 0–0.5)
EOSINOPHIL NFR BLD AUTO: 0.9 % — SIGNIFICANT CHANGE UP (ref 0–6)
HCT VFR BLD CALC: 35.3 % — SIGNIFICANT CHANGE UP (ref 34.5–45)
HGB BLD-MCNC: 12.4 G/DL — SIGNIFICANT CHANGE UP (ref 11.5–15.5)
IMM GRANULOCYTES NFR BLD AUTO: 0.6 % — SIGNIFICANT CHANGE UP (ref 0–1.5)
LYMPHOCYTES # BLD AUTO: 0.98 K/UL — LOW (ref 1–3.3)
LYMPHOCYTES # BLD AUTO: 18.2 % — SIGNIFICANT CHANGE UP (ref 13–44)
MCHC RBC-ENTMCNC: 31.1 PG — SIGNIFICANT CHANGE UP (ref 27–34)
MCHC RBC-ENTMCNC: 35.1 G/DL — SIGNIFICANT CHANGE UP (ref 32–36)
MCV RBC AUTO: 88.5 FL — SIGNIFICANT CHANGE UP (ref 80–100)
MONOCYTES # BLD AUTO: 0.42 K/UL — SIGNIFICANT CHANGE UP (ref 0–0.9)
MONOCYTES NFR BLD AUTO: 7.8 % — SIGNIFICANT CHANGE UP (ref 2–14)
NEUTROPHILS # BLD AUTO: 3.86 K/UL — SIGNIFICANT CHANGE UP (ref 1.8–7.4)
NEUTROPHILS NFR BLD AUTO: 71.9 % — SIGNIFICANT CHANGE UP (ref 43–77)
NRBC # BLD: 0 /100 WBCS — SIGNIFICANT CHANGE UP (ref 0–0)
PLATELET # BLD AUTO: 188 K/UL — SIGNIFICANT CHANGE UP (ref 150–400)
RBC # BLD: 3.99 M/UL — SIGNIFICANT CHANGE UP (ref 3.8–5.2)
RBC # FLD: 11.9 % — SIGNIFICANT CHANGE UP (ref 10.3–14.5)
WBC # BLD: 5.37 K/UL — SIGNIFICANT CHANGE UP (ref 3.8–10.5)
WBC # FLD AUTO: 5.37 K/UL — SIGNIFICANT CHANGE UP (ref 3.8–10.5)

## 2020-11-19 PROCEDURE — 99215 OFFICE O/P EST HI 40 MIN: CPT

## 2020-11-19 RX ORDER — CONJUGATED ESTROGENS 0.62 MG/G
0.62 CREAM VAGINAL
Qty: 90 | Refills: 4 | Status: DISCONTINUED | COMMUNITY
Start: 2019-01-29 | End: 2020-11-19

## 2020-11-19 RX ORDER — OXYCODONE 5 MG/1
5 TABLET ORAL
Qty: 30 | Refills: 0 | Status: DISCONTINUED | COMMUNITY
Start: 2019-04-04 | End: 2020-11-19

## 2020-11-20 LAB
ALBUMIN SERPL ELPH-MCNC: 4.6 G/DL
ALP BLD-CCNC: 92 U/L
ALT SERPL-CCNC: 20 U/L
ANION GAP SERPL CALC-SCNC: 11 MMOL/L
AST SERPL-CCNC: 20 U/L
BILIRUB SERPL-MCNC: 0.3 MG/DL
BUN SERPL-MCNC: 19 MG/DL
CALCIUM SERPL-MCNC: 9 MG/DL
CEA SERPL-MCNC: <0.6 NG/ML
CHLORIDE SERPL-SCNC: 102 MMOL/L
CO2 SERPL-SCNC: 26 MMOL/L
CREAT SERPL-MCNC: 0.8 MG/DL
GLUCOSE SERPL-MCNC: 141 MG/DL
POTASSIUM SERPL-SCNC: 4.3 MMOL/L
PROT SERPL-MCNC: 6.9 G/DL
SODIUM SERPL-SCNC: 139 MMOL/L

## 2020-11-23 NOTE — RESULTS/DATA
[FreeTextEntry1] : CBC WBC 5.34 HGB 12.4  000;\par I discussed the results of the CT scan from January 2020; she has not kept an appointment for earlier in the year.

## 2020-11-23 NOTE — HISTORY OF PRESENT ILLNESS
[Disease: _____________________] : Disease: [unfilled] [T: ___] : T[unfilled] [N: ___] : N[unfilled] [M: ___] : M[unfilled] [Date: ____________] : Patient's last distress assessment performed on [unfilled]. [0 - No Distress] : Distress Level: 0 [de-identified] : 55 -year-old female with history of stage III rectal cancer diagnosed 31/7/2013 status post neoadjuvant Xeloda chemotherapy and radiation, status post APR surgery, status post adjuvant and XELOX chemotherapy (6 months) who presents today for followup.  Adjuvant chemotherapy was delayed due to wound healing issues. She experienced grade 3 neuropathy during adjuvant therapy and oxaliplatin was held for the last cycle.  She presents today for follow up. \par \par 10/1/2018 Patient denies any complaints. No rectal bleeding. No vaginal bleeding or hematuria. No fever or chills. No abdominal pain. Good appetite. Last colonoscopy 10/2015 with normal limits, per GI colonoscopy in 3 years (2018). \par \par 12/6/2018 She is complaining of vaginal bleeding during intercourse. She is noticed also discomfort in the rectal area after intercourse. \par \par 1/3/19 She is here for follow-up. She was complaining of rectal discomfort and gas sensation while urinating. She denies any vaginal bleeding, but the discomfort is still there. She had CT chest/abd/pelvis that was negative for any recurrent disease. She recently had Colonoscopy from her ostomy site. \par She went to see her Colorectal Surgeon but ostomy has a good function. \par 07/2019 seen and physical examination is unchanged from prior examination. Blood testing remains stable.  [de-identified] : This is the patient's first visit to our office since July 2019. She has been not been hospitalized. the patient has been practicing social distancing and wearing a mask through out 2020.  She has been feeling well. She is caring for her colostomy and living with her family. She is taking Zetia for elevated triglyseride

## 2020-11-23 NOTE — ASSESSMENT
[Supportive] : Goals of care discussed with patient: Supportive [Palliative Care Plan] : not applicable at this time [FreeTextEntry1] : MICKIE Herman is a 55 year old female who is now 6 years from her APR for rectal carcinoma. She has had a normal CT scan of the chest and abdomen in January 2020 (the sixth anniversary). CEA level were normal at that time and she has no evidence of disease on physical examination today.\par I am awaiting CEA levels from today.\par I will plan a CT scan for January 4 2021; Afterward in January she will return to [place]. \par if no change on CT scan ,I will hold off on future surveillance scanning as no standard of care calls for indefinite CT scanning on an annual basis in the absence of symptoms. \par Follow up with me in February 2021\par No medication renewal is necessary today. Patient will need regular GI follow up endoscopy at 2 to 3 year interval

## 2020-11-23 NOTE — REVIEW OF SYSTEMS
[Vision Problems] : vision problems [Fever] : no fever [Chills] : no chills [Night Sweats] : no night sweats [Fatigue] : no fatigue [Recent Change In Weight] : ~T no recent weight change [Eye Pain] : no eye pain [Red Eyes] : eyes not red [Dry Eyes] : no dryness of the eyes [Dysphagia] : no dysphagia [Loss of Hearing] : no loss of hearing [Nosebleeds] : no nosebleeds [Hoarseness] : no hoarseness [Odynophagia] : no odynophagia [Mucosal Pain] : no mucosal pain [Chest Pain] : no chest pain [Palpitations] : no palpitations [Leg Claudication] : no intermittent leg claudication [Lower Ext Edema] : no lower extremity edema [Shortness Of Breath] : no shortness of breath [Cough] : no cough [SOB on Exertion] : no shortness of breath during exertion [Abdominal Pain] : no abdominal pain [Vomiting] : no vomiting [Constipation] : no constipation [Diarrhea] : no diarrhea [Dysuria] : no dysuria [Incontinence] : no incontinence [Vaginal Discharge] : no vaginal discharge [Dysmenorrhea/Abn Vaginal Bleeding] : no dysmenorrhea/abnormal vaginal bleeding [Joint Pain] : no joint pain [Joint Stiffness] : no joint stiffness [Muscle Pain] : no muscle pain [Muscle Weakness] : no muscle weakness [Skin Rash] : no skin rash [Skin Wound] : no skin wound [Confused] : no confusion [Dizziness] : no dizziness [Fainting] : no fainting [Difficulty Walking] : no difficulty walking [FreeTextEntry3] : glasses

## 2020-11-23 NOTE — PHYSICAL EXAM
[Fully active, able to carry on all pre-disease performance without restriction] : Status 0 - Fully active, able to carry on all pre-disease performance without restriction [Normal] : affect appropriate [de-identified] : colostomy bag functioning normal. no abdominal masses

## 2020-12-14 ENCOUNTER — RESULT REVIEW (OUTPATIENT)
Age: 55
End: 2020-12-14

## 2020-12-14 ENCOUNTER — OUTPATIENT (OUTPATIENT)
Dept: OUTPATIENT SERVICES | Facility: HOSPITAL | Age: 55
LOS: 1 days | End: 2020-12-14
Payer: COMMERCIAL

## 2020-12-14 ENCOUNTER — APPOINTMENT (OUTPATIENT)
Dept: CT IMAGING | Facility: IMAGING CENTER | Age: 55
End: 2020-12-14
Payer: COMMERCIAL

## 2020-12-14 DIAGNOSIS — Z85.048 PERSONAL HISTORY OF OTHER MALIGNANT NEOPLASM OF RECTUM, RECTOSIGMOID JUNCTION, AND ANUS: ICD-10-CM

## 2020-12-14 DIAGNOSIS — N20.0 CALCULUS OF KIDNEY: Chronic | ICD-10-CM

## 2020-12-14 DIAGNOSIS — Z90.722 ACQUIRED ABSENCE OF OVARIES, BILATERAL: Chronic | ICD-10-CM

## 2020-12-14 DIAGNOSIS — Z98.89 OTHER SPECIFIED POSTPROCEDURAL STATES: Chronic | ICD-10-CM

## 2020-12-14 DIAGNOSIS — Z09 ENCOUNTER FOR FOLLOW-UP EXAMINATION AFTER COMPLETED TREATMENT FOR CONDITIONS OTHER THAN MALIGNANT NEOPLASM: Chronic | ICD-10-CM

## 2020-12-14 PROCEDURE — 71250 CT THORAX DX C-: CPT

## 2020-12-14 PROCEDURE — 71250 CT THORAX DX C-: CPT | Mod: 26

## 2020-12-14 PROCEDURE — 74176 CT ABD & PELVIS W/O CONTRAST: CPT | Mod: 26

## 2020-12-14 PROCEDURE — 74176 CT ABD & PELVIS W/O CONTRAST: CPT

## 2020-12-21 PROBLEM — Z01.419 ENCOUNTER FOR GYNECOLOGICAL EXAMINATION WITH PAPANICOLAOU SMEAR OF CERVIX: Status: RESOLVED | Noted: 2019-01-29 | Resolved: 2020-12-21

## 2021-04-02 ENCOUNTER — RESULT REVIEW (OUTPATIENT)
Age: 56
End: 2021-04-02

## 2021-04-02 ENCOUNTER — OUTPATIENT (OUTPATIENT)
Dept: OUTPATIENT SERVICES | Facility: HOSPITAL | Age: 56
LOS: 1 days | End: 2021-04-02
Payer: COMMERCIAL

## 2021-04-02 ENCOUNTER — APPOINTMENT (OUTPATIENT)
Dept: ULTRASOUND IMAGING | Facility: IMAGING CENTER | Age: 56
End: 2021-04-02
Payer: COMMERCIAL

## 2021-04-02 ENCOUNTER — APPOINTMENT (OUTPATIENT)
Dept: MAMMOGRAPHY | Facility: IMAGING CENTER | Age: 56
End: 2021-04-02
Payer: COMMERCIAL

## 2021-04-02 DIAGNOSIS — Z00.8 ENCOUNTER FOR OTHER GENERAL EXAMINATION: ICD-10-CM

## 2021-04-02 DIAGNOSIS — Z98.89 OTHER SPECIFIED POSTPROCEDURAL STATES: Chronic | ICD-10-CM

## 2021-04-02 DIAGNOSIS — Z90.722 ACQUIRED ABSENCE OF OVARIES, BILATERAL: Chronic | ICD-10-CM

## 2021-04-02 DIAGNOSIS — N20.0 CALCULUS OF KIDNEY: Chronic | ICD-10-CM

## 2021-04-02 DIAGNOSIS — Z09 ENCOUNTER FOR FOLLOW-UP EXAMINATION AFTER COMPLETED TREATMENT FOR CONDITIONS OTHER THAN MALIGNANT NEOPLASM: Chronic | ICD-10-CM

## 2021-04-02 PROCEDURE — 77067 SCR MAMMO BI INCL CAD: CPT | Mod: 26

## 2021-04-02 PROCEDURE — 76641 ULTRASOUND BREAST COMPLETE: CPT | Mod: 26,50

## 2021-04-02 PROCEDURE — 77067 SCR MAMMO BI INCL CAD: CPT

## 2021-04-02 PROCEDURE — 77063 BREAST TOMOSYNTHESIS BI: CPT

## 2021-04-02 PROCEDURE — 77063 BREAST TOMOSYNTHESIS BI: CPT | Mod: 26

## 2021-04-02 PROCEDURE — 76641 ULTRASOUND BREAST COMPLETE: CPT

## 2022-04-10 ENCOUNTER — OUTPATIENT (OUTPATIENT)
Dept: OUTPATIENT SERVICES | Facility: HOSPITAL | Age: 57
LOS: 1 days | Discharge: HOME | End: 2022-04-10

## 2022-04-10 DIAGNOSIS — Z98.89 OTHER SPECIFIED POSTPROCEDURAL STATES: Chronic | ICD-10-CM

## 2022-04-10 DIAGNOSIS — N20.0 CALCULUS OF KIDNEY: Chronic | ICD-10-CM

## 2022-04-10 DIAGNOSIS — C19 MALIGNANT NEOPLASM OF RECTOSIGMOID JUNCTION: ICD-10-CM

## 2022-04-10 DIAGNOSIS — Z09 ENCOUNTER FOR FOLLOW-UP EXAMINATION AFTER COMPLETED TREATMENT FOR CONDITIONS OTHER THAN MALIGNANT NEOPLASM: Chronic | ICD-10-CM

## 2022-04-10 DIAGNOSIS — Z90.722 ACQUIRED ABSENCE OF OVARIES, BILATERAL: Chronic | ICD-10-CM

## 2022-04-13 ENCOUNTER — APPOINTMENT (OUTPATIENT)
Dept: HEMATOLOGY ONCOLOGY | Facility: CLINIC | Age: 57
End: 2022-04-13
Payer: COMMERCIAL

## 2022-04-13 ENCOUNTER — RESULT REVIEW (OUTPATIENT)
Age: 57
End: 2022-04-13

## 2022-04-13 VITALS
BODY MASS INDEX: 29.73 KG/M2 | HEIGHT: 63.98 IN | OXYGEN SATURATION: 97 % | TEMPERATURE: 98.1 F | SYSTOLIC BLOOD PRESSURE: 131 MMHG | DIASTOLIC BLOOD PRESSURE: 82 MMHG | RESPIRATION RATE: 16 BRPM | WEIGHT: 174.17 LBS | HEART RATE: 80 BPM

## 2022-04-13 DIAGNOSIS — N76.0 ACUTE VAGINITIS: ICD-10-CM

## 2022-04-13 LAB
BASOPHILS # BLD AUTO: 0.03 K/UL — SIGNIFICANT CHANGE UP (ref 0–0.2)
BASOPHILS NFR BLD AUTO: 0.5 % — SIGNIFICANT CHANGE UP (ref 0–2)
EOSINOPHIL # BLD AUTO: 0.09 K/UL — SIGNIFICANT CHANGE UP (ref 0–0.5)
EOSINOPHIL NFR BLD AUTO: 1.5 % — SIGNIFICANT CHANGE UP (ref 0–6)
HCT VFR BLD CALC: 35.2 % — SIGNIFICANT CHANGE UP (ref 34.5–45)
HGB BLD-MCNC: 11.8 G/DL — SIGNIFICANT CHANGE UP (ref 11.5–15.5)
IMM GRANULOCYTES NFR BLD AUTO: 0.5 % — SIGNIFICANT CHANGE UP (ref 0–1.5)
LYMPHOCYTES # BLD AUTO: 1.42 K/UL — SIGNIFICANT CHANGE UP (ref 1–3.3)
LYMPHOCYTES # BLD AUTO: 23.4 % — SIGNIFICANT CHANGE UP (ref 13–44)
MCHC RBC-ENTMCNC: 30.2 PG — SIGNIFICANT CHANGE UP (ref 27–34)
MCHC RBC-ENTMCNC: 33.5 G/DL — SIGNIFICANT CHANGE UP (ref 32–36)
MCV RBC AUTO: 90 FL — SIGNIFICANT CHANGE UP (ref 80–100)
MONOCYTES # BLD AUTO: 0.26 K/UL — SIGNIFICANT CHANGE UP (ref 0–0.9)
MONOCYTES NFR BLD AUTO: 4.3 % — SIGNIFICANT CHANGE UP (ref 2–14)
NEUTROPHILS # BLD AUTO: 4.23 K/UL — SIGNIFICANT CHANGE UP (ref 1.8–7.4)
NEUTROPHILS NFR BLD AUTO: 69.8 % — SIGNIFICANT CHANGE UP (ref 43–77)
NRBC # BLD: 0 /100 WBCS — SIGNIFICANT CHANGE UP (ref 0–0)
PLATELET # BLD AUTO: 233 K/UL — SIGNIFICANT CHANGE UP (ref 150–400)
RBC # BLD: 3.91 M/UL — SIGNIFICANT CHANGE UP (ref 3.8–5.2)
RBC # FLD: 12.4 % — SIGNIFICANT CHANGE UP (ref 10.3–14.5)
WBC # BLD: 6.06 K/UL — SIGNIFICANT CHANGE UP (ref 3.8–10.5)
WBC # FLD AUTO: 6.06 K/UL — SIGNIFICANT CHANGE UP (ref 3.8–10.5)

## 2022-04-13 PROCEDURE — 99215 OFFICE O/P EST HI 40 MIN: CPT

## 2022-04-14 LAB
ALBUMIN SERPL ELPH-MCNC: 4.8 G/DL
ALP BLD-CCNC: 55 U/L
ALT SERPL-CCNC: 23 U/L
ANION GAP SERPL CALC-SCNC: 14 MMOL/L
AST SERPL-CCNC: 24 U/L
BILIRUB SERPL-MCNC: 0.3 MG/DL
BUN SERPL-MCNC: 15 MG/DL
CALCIUM SERPL-MCNC: 9.7 MG/DL
CEA SERPL-MCNC: <0.6 NG/ML
CHLORIDE SERPL-SCNC: 104 MMOL/L
CO2 SERPL-SCNC: 24 MMOL/L
CREAT SERPL-MCNC: 0.77 MG/DL
EGFR: 92 ML/MIN/1.73M2
GLUCOSE SERPL-MCNC: 202 MG/DL
POTASSIUM SERPL-SCNC: 3.7 MMOL/L
PROT SERPL-MCNC: 7 G/DL
SODIUM SERPL-SCNC: 142 MMOL/L

## 2022-04-15 NOTE — RESULTS/DATA
[FreeTextEntry1] : CBC WBC 5.34 HGB 12.4  000;\par I discussed the results of the CT scan from January 2020; she has not kept an appointment for earlier in the year.\par CEA <0.5 11/2020

## 2022-04-15 NOTE — ASSESSMENT
[Supportive] : Goals of care discussed with patient: Supportive [Palliative Care Plan] : not applicable at this time [FreeTextEntry1] : MICKIE Herman is a 56 year old female \par She is now 9 years from her APR for rectal carcinoma. She was last seen at our office in 11/2020.\par She has had a normal CT scan of the chest and abdomen in January 2020 (the sixth anniversary). CEA level were normal at that time and she has no evidence of disease on physical examination today.\par I am awaiting CEA levels from today.\par  a CT scan for January 4 2021 was not performed by the patient; Afterward in January  returned to [place]. \par In the past two years she has had two infections with COVID 19 most recently 12/21. She has had vaccination.\par ,I will hold off on future surveillance scanning as no standard of care calls for indefinite CT scanning on an annual basis in the absence of symptoms. \par Patient is seeing a primary care physician Ayaka Strong () for general medical including hyperlipidemia\par Request CBC and CEA level today.\par Follow up in one year\par Patient seen with Glenna HAAS\par

## 2022-04-15 NOTE — HISTORY OF PRESENT ILLNESS
[Disease: _____________________] : Disease: [unfilled] [N: ___] : N[unfilled] [M: ___] : M[unfilled] [Date: ____________] : Patient's last distress assessment performed on [unfilled]. [1 - Distress Level] : Distress Level: 1 [100: Normal, no complaints, no evidence of disease.] : 100: Normal, no complaints, no evidence of disease. [ECOG Performance Status: 0 - Fully active, able to carry on all pre-disease performance without restriction] : Performance Status: 0 - Fully active, able to carry on all pre-disease performance without restriction [T: ___] : T[unfilled] [de-identified] : 56 -year-old female (Anastasiya Oliver pathology report 2013 same ) with history of stage II rectal cancer diagnosed 2013 status post neoadjuvant Xeloda chemotherapy and radiation, status post APR surgery, status post adjuvant and XELOX chemotherapy (6 months) who presents today for followup. Post radiation chemotherapy downstage to yT0 N0 M0\par First seen at age 47 years in .\par  Adjuvant chemotherapy was delayed due to wound healing issues. She experienced grade 3 neuropathy during adjuvant therapy and oxaliplatin was held for the last cycle.  She presents today for follow up. \par \par 10/1/2018 Patient denies any complaints. No rectal bleeding. No vaginal bleeding or hematuria. No fever or chills. No abdominal pain. Good appetite. Last colonoscopy 10/2015 with normal limits, per GI colonoscopy in 3 years (). \par \par 2018 She is complaining of vaginal bleeding during intercourse. She is noticed also discomfort in the rectal area after intercourse. \par \par 1/3/19 She is here for follow-up. She was complaining of rectal discomfort and gas sensation while urinating. She denies any vaginal bleeding, but the discomfort is still there. She had CT chest/abd/pelvis that was negative for any recurrent disease. She recently had Colonoscopy from her ostomy site. \par She went to see her Colorectal Surgeon but ostomy has a good function. \par 2019 seen and physical examination is unchanged from prior examination. Blood testing remains stable.  [de-identified] : rectal adenoacarcinoma [de-identified] : originally clinical T3 [FreeTextEntry1] : status post FolFOX 6 in 2013 [de-identified] : 11/2022: This is the patient's first visit to our office since July 2019. She has been not been hospitalized. the patient has been practicing social distancing and wearing a mask through out 2020.  She has been feeling well. She is caring for her colostomy and living with her family. She is taking Zetia for elevated triglyceride\par 04/13/2022 first visit since 11/2020; she has had no signs of recurrence of disease. weight has been stable no fever no constipation no fatigue.\par She had two episodes of COVID 19. She did not have scheduled CT scan in January 2021.\par Returns for annual visit today

## 2022-04-15 NOTE — PHYSICAL EXAM
[Fully active, able to carry on all pre-disease performance without restriction] : Status 0 - Fully active, able to carry on all pre-disease performance without restriction [Normal] : affect appropriate [Ulcers] : no ulcers [Mucositis] : no mucositis [Thrush] : no thrush [Vesicles] : no vesicles [de-identified] : fundi normal [de-identified] : colostomy bag functioning normal. no abdominal masses

## 2022-04-15 NOTE — REVIEW OF SYSTEMS
[Vision Problems] : vision problems [Fever] : no fever [Chills] : no chills [Night Sweats] : no night sweats [Fatigue] : no fatigue [Recent Change In Weight] : ~T no recent weight change [Eye Pain] : no eye pain [Red Eyes] : eyes not red [Dry Eyes] : no dryness of the eyes [Dysphagia] : no dysphagia [Loss of Hearing] : no loss of hearing [Nosebleeds] : no nosebleeds [Hoarseness] : no hoarseness [Odynophagia] : no odynophagia [Mucosal Pain] : no mucosal pain [Chest Pain] : no chest pain [Palpitations] : no palpitations [Leg Claudication] : no intermittent leg claudication [Lower Ext Edema] : no lower extremity edema [Shortness Of Breath] : no shortness of breath [Cough] : no cough [SOB on Exertion] : no shortness of breath during exertion [Abdominal Pain] : no abdominal pain [Vomiting] : no vomiting [Constipation] : no constipation [Diarrhea] : no diarrhea [Dysuria] : no dysuria [Incontinence] : no incontinence [Vaginal Discharge] : no vaginal discharge [Dysmenorrhea/Abn Vaginal Bleeding] : no dysmenorrhea/abnormal vaginal bleeding [Joint Pain] : no joint pain [Joint Stiffness] : no joint stiffness [Muscle Pain] : no muscle pain [Muscle Weakness] : no muscle weakness [Skin Rash] : no skin rash [Skin Wound] : no skin wound [Confused] : no confusion [Dizziness] : no dizziness [Fainting] : no fainting [Difficulty Walking] : no difficulty walking [FreeTextEntry3] : glasses [FreeTextEntry7] : good colostomy output

## 2022-04-15 NOTE — REASON FOR VISIT
[Follow-Up Visit] : a follow-up [[employer] ] : provided by [employer]   [Time Spent: ____ minutes] : Total time spent using  services: [unfilled] minutes. The patient's primary language is not English thus required  services. [FreeTextEntry2] : follow up for rectal cancer [Interpreters_IDNumber] : [id number] [Interpreters_Relationshiptopatient] : none [TWNoteComboBox1] : Czech

## 2022-09-29 ENCOUNTER — LABORATORY RESULT (OUTPATIENT)
Age: 57
End: 2022-09-29

## 2022-09-29 ENCOUNTER — APPOINTMENT (OUTPATIENT)
Dept: OBGYN | Facility: CLINIC | Age: 57
End: 2022-09-29

## 2022-09-29 VITALS
HEIGHT: 63.98 IN | DIASTOLIC BLOOD PRESSURE: 70 MMHG | SYSTOLIC BLOOD PRESSURE: 120 MMHG | WEIGHT: 173.6 LBS | BODY MASS INDEX: 29.64 KG/M2

## 2022-09-29 DIAGNOSIS — Z01.419 ENCOUNTER FOR GYNECOLOGICAL EXAMINATION (GENERAL) (ROUTINE) W/OUT ABNORMAL FINDINGS: ICD-10-CM

## 2022-09-29 PROCEDURE — 99396 PREV VISIT EST AGE 40-64: CPT

## 2022-09-29 NOTE — DISCUSSION/SUMMARY
[FreeTextEntry1] : 56 y/o LMP 2014 postmenopausal\par Hx of rectal cancer, has colostomy bag, diagnosed at age 49\par no currently on chemo or radiation \par Pap\par Breast imaging\par F/u with GI\par F/u 1 year

## 2022-09-29 NOTE — PHYSICAL EXAM
[Examination Of The Breasts] : a normal appearance [No Masses] : no breast masses were palpable [Labia Majora] : normal [Labia Minora] : normal [Normal] : normal [Uterine Adnexae] : normal [FreeTextEntry7] : left colostomy bag, clean and dry

## 2022-09-29 NOTE — HISTORY OF PRESENT ILLNESS
[FreeTextEntry1] : 56 y/o LMP 2014 postmenopausal\par Hx of rectal cancer, has colostomy bag, diagnosed at age 49\par no currently on chemo or radiation, not planning on reversal of colostomy \par no VB \par Followed by heme onc

## 2022-10-19 LAB
CYTOLOGY CVX/VAG DOC THIN PREP: ABNORMAL
HPV HIGH+LOW RISK DNA PNL CVX: DETECTED

## 2022-11-10 ENCOUNTER — APPOINTMENT (OUTPATIENT)
Dept: OBGYN | Facility: CLINIC | Age: 57
End: 2022-11-10

## 2022-11-10 VITALS — DIASTOLIC BLOOD PRESSURE: 79 MMHG | SYSTOLIC BLOOD PRESSURE: 134 MMHG

## 2022-11-10 DIAGNOSIS — R87.612 LOW GRADE SQUAMOUS INTRAEPITHELIAL LESION ON CYTOLOGIC SMEAR OF CERVIX (LGSIL): ICD-10-CM

## 2022-11-10 PROCEDURE — 57454 BX/CURETT OF CERVIX W/SCOPE: CPT

## 2022-11-10 NOTE — PROCEDURE
[Colposcopy] : Colposcopy  [Risks] : risks [Benefits] : benefits [Patient] : patient [Infection] : infection [Bleeding] : bleeding [LGSIL] : LGSIL [No Premedication] : no premedication [Colposcopy Adequate] : colposcopy adequate [SCI Fully Visualized] : SCI fully visualized [ECC Performed] : ECC performed [Lesion] : lesion seen [Biopsy] : biopsy taken [Hemostasis Obtained] : Hemostasis obtained [Tolerated Well] : the patient tolerated the procedure well [Pap Performed] : pap not performed [de-identified] : 2 [de-identified] : ACW lesion at 4 o'clock [de-identified] : cervix at 4 o'clock \par and ECC [de-identified] : ACW lesion,  [de-identified] : hemostasis obtained with pressure and Astrin [de-identified] : Patient given motrin after procedure  [de-identified] : Impression Squamous metaplasia

## 2022-12-05 ENCOUNTER — NON-APPOINTMENT (OUTPATIENT)
Age: 57
End: 2022-12-05

## 2022-12-05 LAB — CORE LAB BIOPSY: NORMAL

## 2022-12-06 ENCOUNTER — NON-APPOINTMENT (OUTPATIENT)
Age: 57
End: 2022-12-06

## 2023-01-18 ENCOUNTER — APPOINTMENT (OUTPATIENT)
Dept: COLORECTAL SURGERY | Facility: CLINIC | Age: 58
End: 2023-01-18
Payer: MEDICAID

## 2023-01-18 VITALS
BODY MASS INDEX: 31.07 KG/M2 | DIASTOLIC BLOOD PRESSURE: 82 MMHG | TEMPERATURE: 98.7 F | HEART RATE: 74 BPM | SYSTOLIC BLOOD PRESSURE: 138 MMHG | HEIGHT: 64 IN | WEIGHT: 182 LBS | OXYGEN SATURATION: 97 % | RESPIRATION RATE: 16 BRPM

## 2023-01-18 DIAGNOSIS — Z78.9 OTHER SPECIFIED HEALTH STATUS: ICD-10-CM

## 2023-01-18 PROCEDURE — 99205 OFFICE O/P NEW HI 60 MIN: CPT

## 2023-01-18 RX ORDER — MULTIVITAMIN
TABLET ORAL
Refills: 0 | Status: ACTIVE | COMMUNITY

## 2023-01-18 RX ORDER — EZETIMIBE 10 MG/1
10 TABLET ORAL
Qty: 30 | Refills: 3 | Status: DISCONTINUED | COMMUNITY
Start: 2020-11-19 | End: 2023-01-18

## 2023-01-18 RX ORDER — ATORVASTATIN CALCIUM 80 MG/1
TABLET, FILM COATED ORAL
Refills: 0 | Status: ACTIVE | COMMUNITY

## 2023-01-18 NOTE — ASSESSMENT
[FreeTextEntry1] : 57-year-old female with a history of abdominoperineal resection for rectal cancer presents complaining of new-onset discomfort in the perineum and question of pneumaturia.

## 2023-01-18 NOTE — HISTORY OF PRESENT ILLNESS
[FreeTextEntry1] : 58yo F pt with permanent colostomy presents with perineal inflammation and irritation and question of peumaturia, for about 10 days in mid-December, currently c/o inflamed sensation, noticing vaginal bleeding after sex. \par Pmhx of rectal cancer s/p APR in 2014.\par \par Denies bleeding from ostomy, abd pain, nausea, vomiting.\par Last colonoscopy was November 2018 through anus and colostomy, nl study.

## 2023-01-18 NOTE — REASON FOR VISIT
[Consultation] : a consultation visit [[employer] ] : provided by [employer]   [FreeTextEntry1] : Pt intake forms reviewed [Interpreters_IDNumber] : [id number] [Interpreters_FullName] : Leeanna [TWNoteComboBox1] : Swazi

## 2023-01-18 NOTE — PHYSICAL EXAM
[Normal Breath Sounds] : Normal breath sounds [Normal Heart Sounds] : normal heart sounds [Normal Rate and Rhythm] : normal rate and rhythm [Alert] : alert [Oriented to Person] : oriented to person [Oriented to Place] : oriented to place [Oriented to Time] : oriented to time [Calm] : calm [de-identified] : round soft +BS NT/ND, ostomy on left [de-identified] : no inflammation in perineum [de-identified] : well nourished female [de-identified] : NC/AT [de-identified] : +ROM [de-identified] : intact

## 2023-01-20 ENCOUNTER — RESULT REVIEW (OUTPATIENT)
Age: 58
End: 2023-01-20

## 2023-01-26 ENCOUNTER — APPOINTMENT (OUTPATIENT)
Dept: OBGYN | Facility: CLINIC | Age: 58
End: 2023-01-26

## 2023-01-27 ENCOUNTER — OUTPATIENT (OUTPATIENT)
Dept: OUTPATIENT SERVICES | Facility: HOSPITAL | Age: 58
LOS: 1 days | End: 2023-01-27
Payer: COMMERCIAL

## 2023-01-27 ENCOUNTER — APPOINTMENT (OUTPATIENT)
Dept: CT IMAGING | Facility: IMAGING CENTER | Age: 58
End: 2023-01-27
Payer: MEDICAID

## 2023-01-27 DIAGNOSIS — Z85.048 PERSONAL HISTORY OF OTHER MALIGNANT NEOPLASM OF RECTUM, RECTOSIGMOID JUNCTION, AND ANUS: ICD-10-CM

## 2023-01-27 DIAGNOSIS — N20.0 CALCULUS OF KIDNEY: Chronic | ICD-10-CM

## 2023-01-27 DIAGNOSIS — Z90.722 ACQUIRED ABSENCE OF OVARIES, BILATERAL: Chronic | ICD-10-CM

## 2023-01-27 DIAGNOSIS — Z09 ENCOUNTER FOR FOLLOW-UP EXAMINATION AFTER COMPLETED TREATMENT FOR CONDITIONS OTHER THAN MALIGNANT NEOPLASM: Chronic | ICD-10-CM

## 2023-01-27 DIAGNOSIS — Z98.89 OTHER SPECIFIED POSTPROCEDURAL STATES: Chronic | ICD-10-CM

## 2023-01-27 PROCEDURE — 71260 CT THORAX DX C+: CPT

## 2023-01-27 PROCEDURE — 74177 CT ABD & PELVIS W/CONTRAST: CPT

## 2023-01-27 PROCEDURE — 74177 CT ABD & PELVIS W/CONTRAST: CPT | Mod: 26

## 2023-01-27 PROCEDURE — 71260 CT THORAX DX C+: CPT | Mod: 26

## 2023-01-30 ENCOUNTER — OFFICE (OUTPATIENT)
Dept: URBAN - METROPOLITAN AREA CLINIC 63 | Facility: CLINIC | Age: 58
Setting detail: OPHTHALMOLOGY
End: 2023-01-30
Payer: COMMERCIAL

## 2023-01-30 DIAGNOSIS — H43.393: ICD-10-CM

## 2023-01-30 DIAGNOSIS — H16.223: ICD-10-CM

## 2023-01-30 DIAGNOSIS — H52.4: ICD-10-CM

## 2023-01-30 DIAGNOSIS — H25.13: ICD-10-CM

## 2023-01-30 PROCEDURE — 92015 DETERMINE REFRACTIVE STATE: CPT | Performed by: STUDENT IN AN ORGANIZED HEALTH CARE EDUCATION/TRAINING PROGRAM

## 2023-01-30 PROCEDURE — 92004 COMPRE OPH EXAM NEW PT 1/>: CPT | Performed by: STUDENT IN AN ORGANIZED HEALTH CARE EDUCATION/TRAINING PROGRAM

## 2023-01-30 ASSESSMENT — KERATOMETRY
OD_K1POWER_DIOPTERS: 44.00
OS_K1POWER_DIOPTERS: 44.00
OS_K2POWER_DIOPTERS: 44.75
OS_AXISANGLE_DEGREES: 081
OD_K2POWER_DIOPTERS: 44.75
OD_AXISANGLE_DEGREES: 094

## 2023-01-30 ASSESSMENT — REFRACTION_AUTOREFRACTION
OS_SPHERE: +2.50
OD_AXIS: 180
OS_AXIS: 138
OD_SPHERE: +2.50
OD_CYLINDER: -0.50
OS_CYLINDER: -0.25

## 2023-01-30 ASSESSMENT — REFRACTION_MANIFEST
OD_CYLINDER: -0.50
OD_ADD: +2.25
OD_AXIS: 180
OS_ADD: +2.25
OS_VA1: 20/20
OD_SPHERE: +2.50
OS_CYLINDER: -0.25
OS_SPHERE: +2.50
OS_AXIS: 140
OD_VA1: 20/25

## 2023-01-30 ASSESSMENT — TONOMETRY
OD_IOP_MMHG: 19
OS_IOP_MMHG: 20

## 2023-01-30 ASSESSMENT — VISUAL ACUITY
OS_BCVA: 20/25
OD_BCVA: 20/25

## 2023-01-30 ASSESSMENT — LID POSITION - DERMATOCHALASIS
OD_DERMATOCHALASIS: RUL 1+
OS_DERMATOCHALASIS: LUL 1+

## 2023-01-30 ASSESSMENT — REFRACTION_CURRENTRX
OS_CYLINDER: 0.00
OS_AXIS: 180
OD_CYLINDER: 0.00
OS_OVR_VA: 20/
OD_OVR_VA: 20/
OD_SPHERE: +2.00
OS_VPRISM_DIRECTION: SV
OD_VPRISM_DIRECTION: SV
OD_AXIS: 180
OS_SPHERE: +2.00

## 2023-01-30 ASSESSMENT — SPHEQUIV_DERIVED
OS_SPHEQUIV: 2.375
OD_SPHEQUIV: 2.25
OS_SPHEQUIV: 2.375
OD_SPHEQUIV: 2.25

## 2023-01-30 ASSESSMENT — AXIALLENGTH_DERIVED
OD_AL: 22.4518
OD_AL: 22.4518
OS_AL: 22.4078
OS_AL: 22.4078

## 2023-01-30 ASSESSMENT — CONFRONTATIONAL VISUAL FIELD TEST (CVF)
OD_FINDINGS: FULL
OS_FINDINGS: FULL

## 2023-01-30 ASSESSMENT — LID EXAM ASSESSMENTS
OS_MEIBOMITIS: LLL 1+
OD_MEIBOMITIS: RLL 1+

## 2023-01-30 ASSESSMENT — SUPERFICIAL PUNCTATE KERATITIS (SPK)
OD_SPK: 1+
OS_SPK: 1+

## 2023-04-14 ENCOUNTER — OUTPATIENT (OUTPATIENT)
Dept: OUTPATIENT SERVICES | Facility: HOSPITAL | Age: 58
LOS: 1 days | Discharge: HOME | End: 2023-04-14

## 2023-04-14 DIAGNOSIS — Z09 ENCOUNTER FOR FOLLOW-UP EXAMINATION AFTER COMPLETED TREATMENT FOR CONDITIONS OTHER THAN MALIGNANT NEOPLASM: Chronic | ICD-10-CM

## 2023-04-14 DIAGNOSIS — Z98.89 OTHER SPECIFIED POSTPROCEDURAL STATES: Chronic | ICD-10-CM

## 2023-04-14 DIAGNOSIS — N20.0 CALCULUS OF KIDNEY: Chronic | ICD-10-CM

## 2023-04-14 DIAGNOSIS — C19 MALIGNANT NEOPLASM OF RECTOSIGMOID JUNCTION: ICD-10-CM

## 2023-04-14 DIAGNOSIS — Z90.722 ACQUIRED ABSENCE OF OVARIES, BILATERAL: Chronic | ICD-10-CM

## 2023-04-17 ENCOUNTER — RESULT REVIEW (OUTPATIENT)
Age: 58
End: 2023-04-17

## 2023-04-17 ENCOUNTER — APPOINTMENT (OUTPATIENT)
Dept: HEMATOLOGY ONCOLOGY | Facility: CLINIC | Age: 58
End: 2023-04-17
Payer: MEDICAID

## 2023-04-17 VITALS
SYSTOLIC BLOOD PRESSURE: 146 MMHG | BODY MASS INDEX: 30.39 KG/M2 | WEIGHT: 177.03 LBS | RESPIRATION RATE: 16 BRPM | HEART RATE: 74 BPM | TEMPERATURE: 97.2 F | OXYGEN SATURATION: 99 % | DIASTOLIC BLOOD PRESSURE: 76 MMHG

## 2023-04-17 DIAGNOSIS — D23.9 OTHER BENIGN NEOPLASM OF SKIN, UNSPECIFIED: ICD-10-CM

## 2023-04-17 LAB
BASOPHILS # BLD AUTO: 0.06 K/UL — SIGNIFICANT CHANGE UP (ref 0–0.2)
BASOPHILS NFR BLD AUTO: 0.7 % — SIGNIFICANT CHANGE UP (ref 0–2)
EOSINOPHIL # BLD AUTO: 0.23 K/UL — SIGNIFICANT CHANGE UP (ref 0–0.5)
EOSINOPHIL NFR BLD AUTO: 2.8 % — SIGNIFICANT CHANGE UP (ref 0–6)
HCT VFR BLD CALC: 34.8 % — SIGNIFICANT CHANGE UP (ref 34.5–45)
HGB BLD-MCNC: 11.8 G/DL — SIGNIFICANT CHANGE UP (ref 11.5–15.5)
IMM GRANULOCYTES NFR BLD AUTO: 0.6 % — SIGNIFICANT CHANGE UP (ref 0–0.9)
LYMPHOCYTES # BLD AUTO: 1.77 K/UL — SIGNIFICANT CHANGE UP (ref 1–3.3)
LYMPHOCYTES # BLD AUTO: 21.3 % — SIGNIFICANT CHANGE UP (ref 13–44)
MCHC RBC-ENTMCNC: 30.3 PG — SIGNIFICANT CHANGE UP (ref 27–34)
MCHC RBC-ENTMCNC: 33.9 G/DL — SIGNIFICANT CHANGE UP (ref 32–36)
MCV RBC AUTO: 89.2 FL — SIGNIFICANT CHANGE UP (ref 80–100)
MONOCYTES # BLD AUTO: 0.6 K/UL — SIGNIFICANT CHANGE UP (ref 0–0.9)
MONOCYTES NFR BLD AUTO: 7.2 % — SIGNIFICANT CHANGE UP (ref 2–14)
NEUTROPHILS # BLD AUTO: 5.6 K/UL — SIGNIFICANT CHANGE UP (ref 1.8–7.4)
NEUTROPHILS NFR BLD AUTO: 67.4 % — SIGNIFICANT CHANGE UP (ref 43–77)
NRBC # BLD: 0 /100 WBCS — SIGNIFICANT CHANGE UP (ref 0–0)
PLATELET # BLD AUTO: 253 K/UL — SIGNIFICANT CHANGE UP (ref 150–400)
RBC # BLD: 3.9 M/UL — SIGNIFICANT CHANGE UP (ref 3.8–5.2)
RBC # FLD: 12.4 % — SIGNIFICANT CHANGE UP (ref 10.3–14.5)
WBC # BLD: 8.31 K/UL — SIGNIFICANT CHANGE UP (ref 3.8–10.5)
WBC # FLD AUTO: 8.31 K/UL — SIGNIFICANT CHANGE UP (ref 3.8–10.5)

## 2023-04-17 PROCEDURE — 99215 OFFICE O/P EST HI 40 MIN: CPT

## 2023-04-17 RX ORDER — FENOFIBRATE 160 MG/1
160 TABLET ORAL
Qty: 90 | Refills: 3 | Status: ACTIVE | COMMUNITY
Start: 2023-04-17

## 2023-04-19 NOTE — RESULTS/DATA
[FreeTextEntry1] : CBC WBC 5.34 HGB 12.4  000;\par I discussed the results of the CT scan from January 2020; she has not kept an appointment for earlier in the year.\par CEA <0.5 11/2020\par 11/2022 biopsy cervix intraepithelial lesion\par 04/17/2023 CBC WBC 8.31 HGB 11.8 MCV 89  000 copy of the report given to the patient with an explanation in Greek\par 01/2023 CT scan no evidence of metastatic disease

## 2023-04-19 NOTE — HISTORY OF PRESENT ILLNESS
[Disease: _____________________] : Disease: [unfilled] [T: ___] : T[unfilled] [N: ___] : N[unfilled] [M: ___] : M[unfilled] [Date: ____________] : Patient's last distress assessment performed on [unfilled]. [1 - Distress Level] : Distress Level: 1 [100: Normal, no complaints, no evidence of disease.] : 100: Normal, no complaints, no evidence of disease. [ECOG Performance Status: 0 - Fully active, able to carry on all pre-disease performance without restriction] : Performance Status: 0 - Fully active, able to carry on all pre-disease performance without restriction [de-identified] : 56 -year-old female (Anastasiya Oliver pathology report 2013 same ) with history of stage II rectal cancer diagnosed 2013 status post neoadjuvant Xeloda chemotherapy and radiation, status post APR surgery, status post adjuvant and XELOX chemotherapy (6 months) who presents today for followup. Post radiation chemotherapy downstage to yT0 N0 M0\par First seen at age 47 years in .\par  Adjuvant chemotherapy was delayed due to wound healing issues. She experienced grade 3 neuropathy during adjuvant therapy and oxaliplatin was held for the last cycle.  She presents today for follow up. \par \par 10/1/2018 Patient denies any complaints. No rectal bleeding. No vaginal bleeding or hematuria. No fever or chills. No abdominal pain. Good appetite. Last colonoscopy 10/2015 with normal limits, per GI colonoscopy in 3 years (). \par \par 2018 She is complaining of vaginal bleeding during intercourse. She is noticed also discomfort in the rectal area after intercourse. \par \par 1/3/19 She is here for follow-up. She was complaining of rectal discomfort and gas sensation while urinating. She denies any vaginal bleeding, but the discomfort is still there. She had CT chest/abd/pelvis that was negative for any recurrent disease. She recently had Colonoscopy from her ostomy site. \par She went to see her Colorectal Surgeon but ostomy has a good function. \par 2019 seen and physical examination is unchanged from prior examination. Blood testing remains stable.  [de-identified] : rectal adenocarcinoma [de-identified] : originally clinical T3 [FreeTextEntry1] : status post FOLFOX 6 in 2013 Diagnosis Stage 2 adenocarcinoma of rectum [de-identified] : 11/2022: This is the patient's first visit to our office since July 2019. She has been not been hospitalized. the patient has been practicing social distancing and wearing a mask through out 2020.  She has been feeling well. She is caring for her colostomy and living with her family. She is taking Zetia for elevated triglyceride\par 04/13/2022 first visit since 11/2020; she has had no signs of recurrence of disease. weight has been stable no fever no constipation no fatigue.\par She had two episodes of COVID 19. She did not have scheduled CT scan in January 2021.\par Returns for annual visit today\par 04/2022 see chart note.\par 04/17/2023 feels same; no hospitalization. No rectal bleeding. No transfusion. Now 121 months form diagnosis and treatment of stage 2 adenocarcinoma of the rectum 2014

## 2023-04-19 NOTE — REASON FOR VISIT
[Follow-Up Visit] : a follow-up [Other: ______] : provided by [facility] [FreeTextEntry2] : follow up for rectal cancer [Interpreters_FullName] : Dr Basim Wilson MD [Interpreters_Relationshiptopatient] : physician [TWNoteComboBox1] : Indian

## 2023-04-19 NOTE — PHYSICAL EXAM
[Fully active, able to carry on all pre-disease performance without restriction] : Status 0 - Fully active, able to carry on all pre-disease performance without restriction [Normal] : affect appropriate [Ulcers] : no ulcers [Mucositis] : no mucositis [Thrush] : no thrush [Vesicles] : no vesicles [de-identified] : fundi normal [de-identified] : colostomy bag functioning normal. no abdominal masses [FreeTextEntry1] : closed anus no evidence of skin lesion or nodularity [de-identified] : inguinal LN were negative

## 2023-04-19 NOTE — ASSESSMENT
[Supportive] : Goals of care discussed with patient: Supportive [Palliative Care Plan] : not applicable at this time [FreeTextEntry1] : MICKIE Herman is a 56 year old female \par She is now 9 years from her APR for rectal carcinoma. She was last seen at our office in 11/2020.\par She has had a normal CT scan of the chest and abdomen in January 2020 (the sixth anniversary). CEA level were normal at that time and she has no evidence of disease on physical examination today.\par I am awaiting CEA levels from today.\par  a CT scan for January 4 2021 was not performed by the patient; Afterward in January  returned to [place]. \par In the past two years she has had two infections with COVID 19 most recently 12/21. She has had vaccination.\par ,I will hold off on future surveillance scanning as no standard of care calls for indefinite CT scanning on an annual basis in the absence of symptoms. \par Patient is seeing a primary care physician Ayaka Strong () for general medical including hyperlipidemia\par Request CBC and CEA level today.\par Follow up in one year Patient seen with Glenna HAAS\par 04/17/2023 MICKIE franco is seen for her fourth visit with me now on an annual basis.  Patient has had a low grade squamous intra epithelial lesion of the cervix; she is is requested to return to GYN doctor for continued monitoring of the low grade intra epithelial lesion foana rosa in 11/2022. \par No evidence of local  spread of the rectal adenocarcinoma (pTx N0 post neoadjuvant chemotherapy radiation therapy) by physial examination and normal CT scan (as obtained by the colon rectal surgeon in 01/2023 ) has shown no evidence of systemic involvement by cancer. She is now 121 months from diagnosis and treatment.\par RTC in one year. \par \par

## 2023-05-02 ENCOUNTER — EMERGENCY (EMERGENCY)
Facility: HOSPITAL | Age: 58
LOS: 1 days | Discharge: HOME | End: 2023-05-02
Attending: EMERGENCY MEDICINE
Payer: COMMERCIAL

## 2023-05-02 VITALS
DIASTOLIC BLOOD PRESSURE: 81 MMHG | OXYGEN SATURATION: 97 % | HEIGHT: 64 IN | TEMPERATURE: 98 F | RESPIRATION RATE: 18 BRPM | HEART RATE: 76 BPM | SYSTOLIC BLOOD PRESSURE: 134 MMHG | WEIGHT: 173.94 LBS

## 2023-05-02 DIAGNOSIS — N20.0 CALCULUS OF KIDNEY: Chronic | ICD-10-CM

## 2023-05-02 DIAGNOSIS — Z98.89 OTHER SPECIFIED POSTPROCEDURAL STATES: Chronic | ICD-10-CM

## 2023-05-02 DIAGNOSIS — Z09 ENCOUNTER FOR FOLLOW-UP EXAMINATION AFTER COMPLETED TREATMENT FOR CONDITIONS OTHER THAN MALIGNANT NEOPLASM: Chronic | ICD-10-CM

## 2023-05-02 DIAGNOSIS — Z90.722 ACQUIRED ABSENCE OF OVARIES, BILATERAL: Chronic | ICD-10-CM

## 2023-05-02 LAB
ALBUMIN SERPL ELPH-MCNC: 4.5 G/DL — SIGNIFICANT CHANGE UP (ref 3.3–5.2)
ALP SERPL-CCNC: 70 U/L — SIGNIFICANT CHANGE UP (ref 40–120)
ALT FLD-CCNC: 27 U/L — SIGNIFICANT CHANGE UP
ANION GAP SERPL CALC-SCNC: 12 MMOL/L — SIGNIFICANT CHANGE UP (ref 5–17)
APTT BLD: 44.9 SEC — HIGH (ref 27.5–35.5)
AST SERPL-CCNC: 32 U/L — HIGH
BASOPHILS # BLD AUTO: 0.03 K/UL — SIGNIFICANT CHANGE UP (ref 0–0.2)
BASOPHILS NFR BLD AUTO: 0.5 % — SIGNIFICANT CHANGE UP (ref 0–2)
BILIRUB SERPL-MCNC: 0.2 MG/DL — LOW (ref 0.4–2)
BUN SERPL-MCNC: 13 MG/DL — SIGNIFICANT CHANGE UP (ref 8–20)
CALCIUM SERPL-MCNC: 8.8 MG/DL — SIGNIFICANT CHANGE UP (ref 8.4–10.5)
CHLORIDE SERPL-SCNC: 103 MMOL/L — SIGNIFICANT CHANGE UP (ref 96–108)
CO2 SERPL-SCNC: 25 MMOL/L — SIGNIFICANT CHANGE UP (ref 22–29)
CREAT SERPL-MCNC: 0.56 MG/DL — SIGNIFICANT CHANGE UP (ref 0.5–1.3)
EGFR: 108 ML/MIN/1.73M2 — SIGNIFICANT CHANGE UP
EOSINOPHIL # BLD AUTO: 0.18 K/UL — SIGNIFICANT CHANGE UP (ref 0–0.5)
EOSINOPHIL NFR BLD AUTO: 3 % — SIGNIFICANT CHANGE UP (ref 0–6)
GLUCOSE SERPL-MCNC: 185 MG/DL — HIGH (ref 70–99)
HCT VFR BLD CALC: 35.4 % — SIGNIFICANT CHANGE UP (ref 34.5–45)
HGB BLD-MCNC: 11.9 G/DL — SIGNIFICANT CHANGE UP (ref 11.5–15.5)
IMM GRANULOCYTES NFR BLD AUTO: 0.7 % — SIGNIFICANT CHANGE UP (ref 0–0.9)
INR BLD: 1 RATIO — SIGNIFICANT CHANGE UP (ref 0.88–1.16)
LYMPHOCYTES # BLD AUTO: 1.16 K/UL — SIGNIFICANT CHANGE UP (ref 1–3.3)
LYMPHOCYTES # BLD AUTO: 19.3 % — SIGNIFICANT CHANGE UP (ref 13–44)
MCHC RBC-ENTMCNC: 30.5 PG — SIGNIFICANT CHANGE UP (ref 27–34)
MCHC RBC-ENTMCNC: 33.6 GM/DL — SIGNIFICANT CHANGE UP (ref 32–36)
MCV RBC AUTO: 90.8 FL — SIGNIFICANT CHANGE UP (ref 80–100)
MONOCYTES # BLD AUTO: 0.39 K/UL — SIGNIFICANT CHANGE UP (ref 0–0.9)
MONOCYTES NFR BLD AUTO: 6.5 % — SIGNIFICANT CHANGE UP (ref 2–14)
NEUTROPHILS # BLD AUTO: 4.21 K/UL — SIGNIFICANT CHANGE UP (ref 1.8–7.4)
NEUTROPHILS NFR BLD AUTO: 70 % — SIGNIFICANT CHANGE UP (ref 43–77)
PLATELET # BLD AUTO: 281 K/UL — SIGNIFICANT CHANGE UP (ref 150–400)
POTASSIUM SERPL-MCNC: 3.7 MMOL/L — SIGNIFICANT CHANGE UP (ref 3.5–5.3)
POTASSIUM SERPL-SCNC: 3.7 MMOL/L — SIGNIFICANT CHANGE UP (ref 3.5–5.3)
PROT SERPL-MCNC: 6.9 G/DL — SIGNIFICANT CHANGE UP (ref 6.6–8.7)
PROTHROM AB SERPL-ACNC: 11.6 SEC — SIGNIFICANT CHANGE UP (ref 10.5–13.4)
RBC # BLD: 3.9 M/UL — SIGNIFICANT CHANGE UP (ref 3.8–5.2)
RBC # FLD: 12.1 % — SIGNIFICANT CHANGE UP (ref 10.3–14.5)
SODIUM SERPL-SCNC: 140 MMOL/L — SIGNIFICANT CHANGE UP (ref 135–145)
WBC # BLD: 6.01 K/UL — SIGNIFICANT CHANGE UP (ref 3.8–10.5)
WBC # FLD AUTO: 6.01 K/UL — SIGNIFICANT CHANGE UP (ref 3.8–10.5)

## 2023-05-02 PROCEDURE — 74177 CT ABD & PELVIS W/CONTRAST: CPT | Mod: MA

## 2023-05-02 PROCEDURE — 99284 EMERGENCY DEPT VISIT MOD MDM: CPT | Mod: 25

## 2023-05-02 PROCEDURE — 36415 COLL VENOUS BLD VENIPUNCTURE: CPT

## 2023-05-02 PROCEDURE — 73564 X-RAY EXAM KNEE 4 OR MORE: CPT | Mod: 26,LT

## 2023-05-02 PROCEDURE — T1013: CPT

## 2023-05-02 PROCEDURE — 80053 COMPREHEN METABOLIC PANEL: CPT

## 2023-05-02 PROCEDURE — 85025 COMPLETE CBC W/AUTO DIFF WBC: CPT

## 2023-05-02 PROCEDURE — 99284 EMERGENCY DEPT VISIT MOD MDM: CPT

## 2023-05-02 PROCEDURE — 74177 CT ABD & PELVIS W/CONTRAST: CPT | Mod: 26,MA

## 2023-05-02 PROCEDURE — 85730 THROMBOPLASTIN TIME PARTIAL: CPT

## 2023-05-02 PROCEDURE — 73564 X-RAY EXAM KNEE 4 OR MORE: CPT

## 2023-05-02 PROCEDURE — 85610 PROTHROMBIN TIME: CPT
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
## 2023-05-02 NOTE — ED ADULT NURSE NOTE - NSICDXPASTSURGICALHX_GEN_ALL_CORE_FT
PAST SURGICAL HISTORY:  H/O colonoscopy with polypectomy     Kidney calculus s/p surgical excision - right    S/P BSO (bilateral salpingo-oophorectomy) 1/10/14    S/P colorectal surgery, follow-up exam s/p abdominoperineal resection and permanent colostomy 1/10/14
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
## 2023-05-02 NOTE — ED PROVIDER NOTE - WR ORDER STATUS 1
Called patient to schedule an initial appointment with [facility] on behalf of Tristen Jason PharmD. No answer. A voicemail was left for the patient.  Patient should call [phone] to schedule. Will continue attempts to reach patient.    First attempt -  12/10/20    
Performed

## 2023-05-02 NOTE — ED PROVIDER NOTE - PATIENT PORTAL LINK FT
You can access the FollowMyHealth Patient Portal offered by [facility] by registering at the following website: [email or web address]. By joining [employer]’s FollowMyHealth portal, you will also be able to view your health information using other applications (apps) compatible with our system.

## 2023-05-02 NOTE — ED ADULT NURSE NOTE - NSICDXPASTMEDICALHX_GEN_ALL_CORE_FT
PAST MEDICAL HISTORY:  Hyperlipidemia     Rectal adenocarcinoma s/p chemo and radiation    Uterine fibroid
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
## 2023-05-02 NOTE — ED PROVIDER NOTE - ENMT, MLM
Airway patent, Nasal mucosa clear. Mouth with normal mucosa. Throat has no vesicles, no oropharyngeal exudates and uvula is midline. Head atraumatic.

## 2023-05-02 NOTE — ED PROVIDER NOTE - MUSCULOSKELETAL, MLM
Spine appears normal, range of motion is not limited. No midline C/T/L spine TTP. + left anterior knee TTP with FROM noted. No other TTP throughout the rest of the extremities. no reproducible chest wall TTP.
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
## 2023-05-02 NOTE — ED PROVIDER NOTE - CARE PROVIDER_API CALL
Jerome Waldrop)  Orthopaedic Surgery  [address] [place] [address]  Phone: [phone]  Fax: [phone]  Follow Up Time:

## 2023-07-21 ENCOUNTER — APPOINTMENT (OUTPATIENT)
Dept: COLORECTAL SURGERY | Facility: CLINIC | Age: 58
End: 2023-07-21
Payer: MEDICAID

## 2023-07-21 DIAGNOSIS — L29.3 ANOGENITAL PRURITUS, UNSPECIFIED: ICD-10-CM

## 2023-07-21 PROCEDURE — 99213 OFFICE O/P EST LOW 20 MIN: CPT

## 2023-07-21 NOTE — PHYSICAL EXAM
[Normal Breath Sounds] : Normal breath sounds [Wheezing] : no wheezing was heard [Normal Heart Sounds] : normal heart sounds [Normal Rate and Rhythm] : normal rate and rhythm [Alert] : alert [Oriented to Person] : oriented to person [Oriented to Place] : oriented to place [Oriented to Time] : oriented to time [Calm] : calm [de-identified] : soft, NT/ND, + colostomy [de-identified] : Perineal wound well-healed without any skin changes or evidence of infection.  There is significant moisture in the area but otherwise there are no visible or palpable abnormalities [de-identified] : NAD [de-identified] : NCAT [de-identified] : supple [de-identified] : normal ROM [de-identified] : warm

## 2023-07-21 NOTE — HISTORY OF PRESENT ILLNESS
[FreeTextEntry1] : The patient is interviewed with  Cher #[id number]\par \par The patient presents today after being seen by her GYN (Dr. Gabriela Reddy) this past November for a Pap that revealed ANJALI as well as a test that was positive for high risk HPV strain.\par She now reports some burning externally and states that her partner gets " an infection" every time they have sex.\par She has been given antibiotics as well as topical steroids and MetroGel and still feels burning

## 2023-07-21 NOTE — ASSESSMENT
[FreeTextEntry1] : There is no evidence of any skin abnormalities to explain the patient's burning sensation\par I explained that all of her other concerns and complaints are GYN related and she should follow-up with her GYN for them\par She will try Calmoseptine on the perineal skin to help with the burning\par \par She reports that she is also due for a colonoscopy as her last one was 3 years ago.  The last one we have in our system is from 2018\par R/B/A d/w the pt including but not limited to bleeding, perforation, and missed lesions. Miralax prep given. All questions answered.\par

## 2023-08-30 ENCOUNTER — APPOINTMENT (OUTPATIENT)
Dept: GYNECOLOGIC ONCOLOGY | Facility: CLINIC | Age: 58
End: 2023-08-30
Payer: MEDICAID

## 2023-08-30 VITALS
HEIGHT: 64 IN | HEART RATE: 89 BPM | DIASTOLIC BLOOD PRESSURE: 81 MMHG | SYSTOLIC BLOOD PRESSURE: 151 MMHG | BODY MASS INDEX: 29.71 KG/M2 | WEIGHT: 174 LBS | OXYGEN SATURATION: 99 % | RESPIRATION RATE: 16 BRPM

## 2023-08-30 DIAGNOSIS — R87.611 ATYPICAL SQUAMOUS CELLS CANNOT EXCLUDE HIGH GRADE SQUAMOUS INTRAEPITHELIAL LESION ON CYTOLOGIC SMEAR OF CERVIX (ASC-H): ICD-10-CM

## 2023-08-30 PROCEDURE — 57452 EXAM OF CERVIX W/SCOPE: CPT

## 2023-08-30 PROCEDURE — 99204 OFFICE O/P NEW MOD 45 MIN: CPT

## 2023-08-30 NOTE — HISTORY OF PRESENT ILLNESS
[FreeTextEntry1] : [employer]  ID # [id number]  57 y/o G P female, LMP being referred by Dr. Reddy for abnormal PAP. 9/29/22 HPV HR +, 16/18/45 negative, LSIL.    Patient with history of stage II rectal  cancer diagnosed 3/17/12 s/p neoadjuvant Xeloda chemotherapy and radiation, s/p MARINE surgery s/p adjuvant and XELOX chemotherapy (6 months). Post radiation chemotherapy downstage to [id number].

## 2023-08-30 NOTE — CHIEF COMPLAINT
[FreeTextEntry1] : [facility] [phone] at [address] [place] [address]

## 2023-09-05 ENCOUNTER — APPOINTMENT (OUTPATIENT)
Dept: ULTRASOUND IMAGING | Facility: CLINIC | Age: 58
End: 2023-09-05
Payer: MEDICAID

## 2023-09-05 ENCOUNTER — OUTPATIENT (OUTPATIENT)
Dept: OUTPATIENT SERVICES | Facility: HOSPITAL | Age: 58
LOS: 1 days | End: 2023-09-05

## 2023-09-05 DIAGNOSIS — Z90.722 ACQUIRED ABSENCE OF OVARIES, BILATERAL: Chronic | ICD-10-CM

## 2023-09-05 DIAGNOSIS — Z98.89 OTHER SPECIFIED POSTPROCEDURAL STATES: Chronic | ICD-10-CM

## 2023-09-05 DIAGNOSIS — N20.0 CALCULUS OF KIDNEY: Chronic | ICD-10-CM

## 2023-09-05 DIAGNOSIS — Z09 ENCOUNTER FOR FOLLOW-UP EXAMINATION AFTER COMPLETED TREATMENT FOR CONDITIONS OTHER THAN MALIGNANT NEOPLASM: Chronic | ICD-10-CM

## 2023-09-05 DIAGNOSIS — Z00.8 ENCOUNTER FOR OTHER GENERAL EXAMINATION: ICD-10-CM

## 2023-09-05 PROCEDURE — 76856 US EXAM PELVIC COMPLETE: CPT | Mod: 26

## 2023-09-05 PROCEDURE — 76830 TRANSVAGINAL US NON-OB: CPT | Mod: 26

## 2023-09-18 PROBLEM — R87.611 PAP SMEAR OF CERVIX WITH ASCUS, CANNOT EXCLUDE HGSIL: Status: ACTIVE | Noted: 2023-09-18

## 2023-09-29 ENCOUNTER — APPOINTMENT (OUTPATIENT)
Dept: GYNECOLOGIC ONCOLOGY | Facility: CLINIC | Age: 58
End: 2023-09-29
Payer: MEDICAID

## 2023-09-29 DIAGNOSIS — B37.31 ACUTE CANDIDIASIS OF VULVA AND VAGINA: ICD-10-CM

## 2023-09-29 DIAGNOSIS — R87.810 CERVICAL HIGH RISK HUMAN PAPILLOMAVIRUS (HPV) DNA TEST POSITIVE: ICD-10-CM

## 2023-09-29 PROCEDURE — 99213 OFFICE O/P EST LOW 20 MIN: CPT | Mod: 95

## 2024-01-16 ENCOUNTER — OUTPATIENT (OUTPATIENT)
Dept: OUTPATIENT SERVICES | Facility: HOSPITAL | Age: 59
LOS: 1 days | Discharge: HOME | End: 2024-01-16

## 2024-01-16 DIAGNOSIS — Z90.722 ACQUIRED ABSENCE OF OVARIES, BILATERAL: Chronic | ICD-10-CM

## 2024-01-16 DIAGNOSIS — N20.0 CALCULUS OF KIDNEY: Chronic | ICD-10-CM

## 2024-01-16 DIAGNOSIS — Z09 ENCOUNTER FOR FOLLOW-UP EXAMINATION AFTER COMPLETED TREATMENT FOR CONDITIONS OTHER THAN MALIGNANT NEOPLASM: Chronic | ICD-10-CM

## 2024-01-16 DIAGNOSIS — Z98.89 OTHER SPECIFIED POSTPROCEDURAL STATES: Chronic | ICD-10-CM

## 2024-01-16 DIAGNOSIS — C19 MALIGNANT NEOPLASM OF RECTOSIGMOID JUNCTION: ICD-10-CM

## 2024-01-30 ENCOUNTER — OFFICE (OUTPATIENT)
Dept: URBAN - METROPOLITAN AREA CLINIC 63 | Facility: CLINIC | Age: 59
Setting detail: OPHTHALMOLOGY
End: 2024-01-30
Payer: MEDICAID

## 2024-01-30 DIAGNOSIS — H25.13: ICD-10-CM

## 2024-01-30 DIAGNOSIS — H43.393: ICD-10-CM

## 2024-01-30 DIAGNOSIS — H16.223: ICD-10-CM

## 2024-01-30 PROCEDURE — 92014 COMPRE OPH EXAM EST PT 1/>: CPT | Performed by: STUDENT IN AN ORGANIZED HEALTH CARE EDUCATION/TRAINING PROGRAM

## 2024-01-30 PROCEDURE — 92250 FUNDUS PHOTOGRAPHY W/I&R: CPT | Performed by: STUDENT IN AN ORGANIZED HEALTH CARE EDUCATION/TRAINING PROGRAM

## 2024-01-30 ASSESSMENT — REFRACTION_CURRENTRX
OD_SPHERE: +2.00
OS_CYLINDER: 0.00
OS_VPRISM_DIRECTION: SV
OS_OVR_VA: 20/
OD_AXIS: 180
OD_CYLINDER: 0.00
OS_SPHERE: +2.00
OD_VPRISM_DIRECTION: SV
OD_OVR_VA: 20/
OS_AXIS: 180

## 2024-01-30 ASSESSMENT — CONFRONTATIONAL VISUAL FIELD TEST (CVF)
OD_FINDINGS: FULL
OS_FINDINGS: FULL

## 2024-01-30 ASSESSMENT — LID POSITION - DERMATOCHALASIS
OS_DERMATOCHALASIS: LUL 1+
OD_DERMATOCHALASIS: RUL 1+

## 2024-01-30 ASSESSMENT — REFRACTION_AUTOREFRACTION
OD_SPHERE: +2.75
OD_AXIS: 180
OD_CYLINDER: -0.50
OS_AXIS: 097
OS_SPHERE: +2.75
OS_CYLINDER: -0.25

## 2024-01-30 ASSESSMENT — LID EXAM ASSESSMENTS
OS_MEIBOMITIS: LLL 1+
OD_MEIBOMITIS: RLL 1+

## 2024-01-30 ASSESSMENT — SPHEQUIV_DERIVED
OS_SPHEQUIV: 2.625
OD_SPHEQUIV: 2.5
OD_SPHEQUIV: 2.25
OS_SPHEQUIV: 2.375

## 2024-01-30 ASSESSMENT — REFRACTION_MANIFEST
OS_VA1: 20/20
OD_AXIS: 180
OS_AXIS: 140
OD_CYLINDER: -0.50
OS_SPHERE: +2.50
OD_ADD: +2.25
OS_CYLINDER: -0.25
OD_SPHERE: +2.50
OS_ADD: +2.25
OD_VA1: 20/25

## 2024-01-30 ASSESSMENT — SUPERFICIAL PUNCTATE KERATITIS (SPK)
OD_SPK: 1+
OS_SPK: 1+

## 2024-02-29 ENCOUNTER — EMERGENCY (EMERGENCY)
Facility: HOSPITAL | Age: 59
LOS: 1 days | Discharge: HOME | End: 2024-02-29
Attending: EMERGENCY MEDICINE
Payer: MEDICAID

## 2024-02-29 VITALS
WEIGHT: 174.83 LBS | TEMPERATURE: 98 F | DIASTOLIC BLOOD PRESSURE: 78 MMHG | SYSTOLIC BLOOD PRESSURE: 150 MMHG | HEIGHT: 64 IN | HEART RATE: 72 BPM | OXYGEN SATURATION: 98 % | RESPIRATION RATE: 20 BRPM

## 2024-02-29 DIAGNOSIS — Z90.722 ACQUIRED ABSENCE OF OVARIES, BILATERAL: Chronic | ICD-10-CM

## 2024-02-29 DIAGNOSIS — Z98.89 OTHER SPECIFIED POSTPROCEDURAL STATES: Chronic | ICD-10-CM

## 2024-02-29 DIAGNOSIS — Z09 ENCOUNTER FOR FOLLOW-UP EXAMINATION AFTER COMPLETED TREATMENT FOR CONDITIONS OTHER THAN MALIGNANT NEOPLASM: Chronic | ICD-10-CM

## 2024-02-29 DIAGNOSIS — N20.0 CALCULUS OF KIDNEY: Chronic | ICD-10-CM

## 2024-02-29 PROCEDURE — 12002 RPR S/N/AX/GEN/TRNK2.6-7.5CM: CPT

## 2024-02-29 PROCEDURE — 99284 EMERGENCY DEPT VISIT MOD MDM: CPT | Mod: 25

## 2024-02-29 PROCEDURE — 99283 EMERGENCY DEPT VISIT LOW MDM: CPT

## 2024-02-29 PROCEDURE — T1013: CPT

## 2024-02-29 PROCEDURE — 12002 RPR S/N/AX/GEN/TRNK2.6-7.5CM: CPT | Mod: F3

## 2024-02-29 RX ORDER — CEPHALEXIN 500 MG
1 CAPSULE ORAL
Qty: 20 | Refills: 0
Start: 2024-02-29 | End: 2024-03-04

## 2024-02-29 RX ORDER — IBUPROFEN 200 MG
600 TABLET ORAL ONCE
Refills: 0 | Status: COMPLETED | OUTPATIENT
Start: 2024-02-29 | End: 2024-02-29

## 2024-02-29 RX ORDER — IBUPROFEN 200 MG
1 TABLET ORAL
Qty: 20 | Refills: 0
Start: 2024-02-29 | End: 2024-03-04

## 2024-02-29 RX ADMIN — Medication 600 MILLIGRAM(S): at 23:01

## 2024-02-29 NOTE — ED PROCEDURE NOTE - CPROC ED TIME OUT STATEMENT1
Statement Selected
“Patient's name, , procedure and correct site were confirmed during the [place] Timeout.”

## 2024-02-29 NOTE — ED PROVIDER NOTE - ADDITIONAL NOTES AND INSTRUCTIONS:
PT was evaluated At [facility] ED and was found to have a condition that warranted time of to rest and heal from WORK/SCHOOL.   Mitesh Almaraz PA-C

## 2024-02-29 NOTE — ED PROVIDER NOTE - CLINICAL SUMMARY MEDICAL DECISION MAKING FREE TEXT BOX
Patient with no significant past medical history presents emergency department for evaluation of laceration to left fourth digit.  Patient states that she was at home and cut her finger with knife clean the wound once urgent care was unable to have a close there referred to the emergency department.  Patient states she has mild mild pain that is nonradiating.  Patient denies numbness tingling loss sensation weakness.  On exam patient with 3 cm laceration superficial no visible tendon damage range of motion intact strength intact compared to contralateral side.  Wound clean closed without incident patient to DC home with supportive care follow-up to PCP return for suture removal in 7 days.  Patient educated about when to return to the ED if needed. PT verbalizes that he understands all instructions and results. Pt infomred that ED is open and availible 24/7 365 days a yr, encouraged to return to the ED if they have any change in condition, or feel the need for revaluation.    utilized to obtain History, ROS, Physical Exam, explanations of results and plan of care, as well as follow up instructions.

## 2024-02-29 NOTE — ED PROVIDER NOTE - PATIENT PORTAL LINK FT
You can access the FollowMyHealth Patient Portal offered by [facility] by registering at the following website: [email or web address]. By joining [facility]’s FollowMyHealth portal, you will also be able to view your health information using other applications (apps) compatible with our system.

## 2024-02-29 NOTE — ED PROVIDER NOTE - ATTENDING APP SHARED VISIT CONTRIBUTION OF CARE
I, Anish Rizvi, performed the initial face to face bedside interview with this patient regarding history of present illness, review of symptoms and relevant past medical, social and family history.  I completed an independent physical examination.  I was the initial provider who evaluated this patient. I have signed out the follow up of any pending tests (i.e. labs, radiological studies) to the ACP.  I have communicated the patient’s plan of care and disposition with the ACP.

## 2024-02-29 NOTE — ED PROVIDER NOTE - NSFOLLOWUPINSTRUCTIONS_ED_ALL_ED_FT
Educación para el paciente: Suturas y grapas (Conceptos Básicos)  Redactado por los médicos y editores de UpToDate  There is a newer version of this topic available in English.Hay joshua versión de dixon artículo más actualizada disponible en Inglés.  Nohelia el Descargo de responsabilidad al final de esta página.    ¿Qué son las suturas?  Las suturas son joshua forma en que los médicos pueden cerrar algunos tipos de pearson. El médico utiliza aguja e hilo especiales para las suturas, cose los bordes del suzette para unirlos y da puntadas para mantener las suturas en morgan lugar (figura 1). Las suturas también se llaman “puntos”.    Existen dos tipos principales de sutura:    ?Absorbible – Estas suturas se disuelven con el paso del tiempo. No es necesario sacarlas.    ?No absorbible – Estas suturas se deben sacar después de cierto tiempo. No se disuelven.    En algunos casos (por ejemplo, si tiene un suzette muy profundo), el médico podría colocarle suturas y además algo llamado "pegamento para la piel" o "adhesivo tisular".    ¿Qué son las grapas?  Otra forma que utilizan los médicos para cerrar los pearson son las grapas. Las grapas que se usan para curaciones médicas son diferentes a las que se usan para el papel. Para colocarlas, los médicos usan joshua grapadora especial (figura 2). Las grapas se deben sacar después de cierto tiempo, al igual que las suturas no absorbibles.    ¿Cómo sé si necesito suturas o grapas?  Un médico o enfermero deberá decidirlo tras examinar el suzette. En general, se necesitan suturas o grapas si el suzette es adryan, disparejo o tan profundo que atraviesa la piel. Los pearson se curan solos sin necesidad de suturas o grapas, lowell estas contribuyen a que la curación sea más rápida y no quede mucha cicatriz.    Los pearson y las raspaduras pequeños que no son muy profundos por lo general no necesitan suturas. Si se corta y no sabe si necesita suturas, consulte a morgan médico o enfermero.    ¿Qué pasa cuando me ponen suturas o grapas?  Antes de suturar o colocar las grapas, el médico limpiará gerardo la herida. Además, le dará un medicamento para adormecer el área, para que no sienta dolor cuando le pongan las suturas o las grapas.    Después de que el médico suture el suzette o le coloque grapas, cubrirá la brock con joshua gasa o venda.    ¿Por qué es importante cuidar las suturas o las grapas?  Es importante cuidar las suturas o las grapas para que la herida se cure gerardo y no se infecte.    ¿Cómo se cuidan las suturas o las grapas?  El médico o enfermero le dará instrucciones específicas, según el tipo de suturas que tenga y la brock del cuerpo donde estén. Las grapas requieren el mismo tipo de cuidado que las suturas no absorbibles.    Estos son algunos consejos generales:    ?Mantenga las suturas o las grapas secas y tapadas con joshua venda. Las suturas no absorbibles y las grapas deben mantenerse secas renee 1 a 2 días. Las suturas absorbibles a veces deben mantenerse secas renee más tiempo. El médico o enfermero le dirá exactamente renee cuánto tiempo deberá mantener las suturas secas.    ?Cuando ya no sea necesario mantener las suturas o las grapas secas, lávelas en forma delicada con jabón y agua mientras se ducha. No sumerja las suturas o las grapas en agua, por ejemplo en joshua bañera, joshua piscina o un kisha. Si lo hace, el proceso de curación puede volverse más lento y es posible que aumenten las posibilidades de contraer joshua infección.    ?Después de mary las suturas o las grapas, séquelas con golpecitos leves y aplíqueles algún ungüento antibiótico    ?Cubra las suturas o las grapas con joshua venda o gasa, sawyer que el médico o enfermero le haya recomendado lo contrario    ?Evite realizar actividades o deportes que puedan lastimar la brock de las suturas o las grapas renee 1 a 2 semanas. (El médico o enfermero le dirá exactamente renee cuánto tiempo debe evitar estas actividades). Si vuelve a lastimarse en el mismo lugar, las suturas se pueden salir y el suzette podría volver a abrirse.    ¿Cuándo annetta llamar al médico o enfermero?  Llame a morgan médico o enfermero si:    ?Se salen las suturas o el suzette se vuelve a abrir    ?Tiene fiebre    ?La brock del suzette se pone pepe o se inflama, o le sale pus de la herida. Es normal que renee los primeros días le salga un líquido amarillo jaz de la herida.    ¿Cuándo me quitarán las suturas o las grapas?  El médico que le ponga las suturas o las grapas le dirá cuándo debe shea al médico o enfermero para que se las quiten. Las suturas no absorbibles en general se roberto carlos renee 5 a 14 días, según la parte del cuerpo donde se encuentren. Las grapas suelen dejarse renee 7 a 10 días.    Las grapas se deben sacar con joshua máquina especial para sacar grapas, lowell en los consultorios médicos no siempre tienen dixon aparato. El médico que le coloque las grapas podría darle un aparato para sacarlas. Si es así, llévelo al consultorio de morgan médico cuando vaya a sacarse las grapas.    ¿Qué annetta hacer después de que me quiten las suturas o las grapas?  Después de que le quiten las suturas o las grapas, debe proteger la cicatriz del sol. Use protector solar en la brock o lleve prendas de vestir o sombreros que cubran la cicatriz.    El médico o enfermero podría también recomendarle que use alguna loción o crema para ayudar a curar la herida.

## 2024-02-29 NOTE — ED ADULT TRIAGE NOTE - CHIEF COMPLAINT QUOTE
Patient sent to ED from urgent care for laceration to left ring finger that occurred while cutting meat with knife at 1930.  Per daughter, while at urgent care the finger was gushing blood.  Unable to visualize wound since patient refuses to remove pressure or allow clean dressing to be applied.  No meds PTA.  Last Tetanus ? 8 years ago

## 2024-03-10 ENCOUNTER — NON-APPOINTMENT (OUTPATIENT)
Age: 59
End: 2024-03-10

## 2024-03-28 ENCOUNTER — APPOINTMENT (OUTPATIENT)
Dept: COLORECTAL SURGERY | Facility: CLINIC | Age: 59
End: 2024-03-28

## 2024-04-16 ENCOUNTER — OUTPATIENT (OUTPATIENT)
Dept: OUTPATIENT SERVICES | Facility: HOSPITAL | Age: 59
LOS: 1 days | Discharge: HOME | End: 2024-04-16

## 2024-04-16 DIAGNOSIS — C19 MALIGNANT NEOPLASM OF RECTOSIGMOID JUNCTION: ICD-10-CM

## 2024-04-16 DIAGNOSIS — Z98.89 OTHER SPECIFIED POSTPROCEDURAL STATES: Chronic | ICD-10-CM

## 2024-04-16 DIAGNOSIS — N20.0 CALCULUS OF KIDNEY: Chronic | ICD-10-CM

## 2024-04-16 DIAGNOSIS — Z90.722 ACQUIRED ABSENCE OF OVARIES, BILATERAL: Chronic | ICD-10-CM

## 2024-04-22 ENCOUNTER — RESULT REVIEW (OUTPATIENT)
Age: 59
End: 2024-04-22

## 2024-04-22 ENCOUNTER — APPOINTMENT (OUTPATIENT)
Dept: HEMATOLOGY ONCOLOGY | Facility: CLINIC | Age: 59
End: 2024-04-22
Payer: MEDICAID

## 2024-04-22 ENCOUNTER — APPOINTMENT (OUTPATIENT)
Dept: HEMATOLOGY ONCOLOGY | Facility: CLINIC | Age: 59
End: 2024-04-22

## 2024-04-22 VITALS
DIASTOLIC BLOOD PRESSURE: 85 MMHG | WEIGHT: 172.62 LBS | HEART RATE: 72 BPM | TEMPERATURE: 98.4 F | BODY MASS INDEX: 29.47 KG/M2 | SYSTOLIC BLOOD PRESSURE: 154 MMHG | RESPIRATION RATE: 16 BRPM | OXYGEN SATURATION: 98 % | HEIGHT: 64 IN

## 2024-04-22 DIAGNOSIS — Z85.048 PERSONAL HISTORY OF OTHER MALIGNANT NEOPLASM OF RECTUM, RECTOSIGMOID JUNCTION, AND ANUS: ICD-10-CM

## 2024-04-22 LAB
BASOPHILS # BLD AUTO: 0.06 K/UL — SIGNIFICANT CHANGE UP (ref 0–0.2)
BASOPHILS NFR BLD AUTO: 0.8 % — SIGNIFICANT CHANGE UP (ref 0–2)
EOSINOPHIL # BLD AUTO: 0.14 K/UL — SIGNIFICANT CHANGE UP (ref 0–0.5)
EOSINOPHIL NFR BLD AUTO: 1.9 % — SIGNIFICANT CHANGE UP (ref 0–6)
HCT VFR BLD CALC: 35.7 % — SIGNIFICANT CHANGE UP (ref 34.5–45)
HGB BLD-MCNC: 12.4 G/DL — SIGNIFICANT CHANGE UP (ref 11.5–15.5)
IMM GRANULOCYTES NFR BLD AUTO: 0.8 % — SIGNIFICANT CHANGE UP (ref 0–0.9)
LYMPHOCYTES # BLD AUTO: 1.5 K/UL — SIGNIFICANT CHANGE UP (ref 1–3.3)
LYMPHOCYTES # BLD AUTO: 20.9 % — SIGNIFICANT CHANGE UP (ref 13–44)
MCHC RBC-ENTMCNC: 30.8 PG — SIGNIFICANT CHANGE UP (ref 27–34)
MCHC RBC-ENTMCNC: 34.7 G/DL — SIGNIFICANT CHANGE UP (ref 32–36)
MCV RBC AUTO: 88.6 FL — SIGNIFICANT CHANGE UP (ref 80–100)
MONOCYTES # BLD AUTO: 0.49 K/UL — SIGNIFICANT CHANGE UP (ref 0–0.9)
MONOCYTES NFR BLD AUTO: 6.8 % — SIGNIFICANT CHANGE UP (ref 2–14)
NEUTROPHILS # BLD AUTO: 4.94 K/UL — SIGNIFICANT CHANGE UP (ref 1.8–7.4)
NEUTROPHILS NFR BLD AUTO: 68.8 % — SIGNIFICANT CHANGE UP (ref 43–77)
NRBC # BLD: 0 /100 WBCS — SIGNIFICANT CHANGE UP (ref 0–0)
PLATELET # BLD AUTO: 271 K/UL — SIGNIFICANT CHANGE UP (ref 150–400)
RBC # BLD: 4.03 M/UL — SIGNIFICANT CHANGE UP (ref 3.8–5.2)
RBC # FLD: 11.7 % — SIGNIFICANT CHANGE UP (ref 10.3–14.5)
WBC # BLD: 7.19 K/UL — SIGNIFICANT CHANGE UP (ref 3.8–10.5)
WBC # FLD AUTO: 7.19 K/UL — SIGNIFICANT CHANGE UP (ref 3.8–10.5)

## 2024-04-22 PROCEDURE — 99215 OFFICE O/P EST HI 40 MIN: CPT

## 2024-04-23 LAB
ALBUMIN SERPL ELPH-MCNC: 5 G/DL
ALP BLD-CCNC: 61 U/L
ALT SERPL-CCNC: 17 U/L
ANION GAP SERPL CALC-SCNC: 14 MMOL/L
AST SERPL-CCNC: 27 U/L
BILIRUB SERPL-MCNC: 0.4 MG/DL
BUN SERPL-MCNC: 17 MG/DL
CALCIUM SERPL-MCNC: 9.8 MG/DL
CEA SERPL-MCNC: <0.6 NG/ML
CHLORIDE SERPL-SCNC: 102 MMOL/L
CO2 SERPL-SCNC: 24 MMOL/L
CREAT SERPL-MCNC: 0.7 MG/DL
EGFR: 101 ML/MIN/1.73M2
GLUCOSE SERPL-MCNC: 94 MG/DL
POTASSIUM SERPL-SCNC: 4.4 MMOL/L
PROT SERPL-MCNC: 7.2 G/DL
SODIUM SERPL-SCNC: 140 MMOL/L

## 2024-04-24 NOTE — HISTORY OF PRESENT ILLNESS
[Disease: _____________________] : Disease: [unfilled] [T: ___] : T[unfilled] [N: ___] : N[unfilled] [M: ___] : M[unfilled] [Date: ____________] : Patient's last distress assessment performed on [unfilled]. [1 - Distress Level] : Distress Level: 1 [100: Normal, no complaints, no evidence of disease.] : 100: Normal, no complaints, no evidence of disease. [ECOG Performance Status: 0 - Fully active, able to carry on all pre-disease performance without restriction] : Performance Status: 0 - Fully active, able to carry on all pre-disease performance without restriction [de-identified] : 58 -year-old female (Anastasiya Oliver pathology report 2013 same ) with history of stage II rectal cancer diagnosed 2013 status post neoadjuvant Xeloda chemotherapy and radiation, status post APR surgery (Dr Jennie Hardy), status post adjuvant and XELOX chemotherapy (6 months- [facility])  who presents today for follow-up. Post radiation chemotherapy downstage to yT0 N0 M0 First seen at age 47 years in .  Adjuvant chemotherapy was delayed due to wound healing issues. She experienced grade 3 neuropathy during adjuvant therapy and oxaliplatin was held for the last cycle.  She presents today for follow up.  10/1/2018 Patient denies any complaints. No rectal bleeding. No vaginal bleeding or hematuria. No fever or chills. No abdominal pain. Good appetite. Last colonoscopy 10/2015 with normal limits, per GI colonoscopy in 3 years (2018).  2018 She is complaining of vaginal bleeding during intercourse. She is noticed also discomfort in the rectal area after intercourse.  1/3/19 She is here for follow-up. She was complaining of rectal discomfort and gas sensation while urinating. She denies any vaginal bleeding, but the discomfort is still there. She had CT chest/abd/pelvis that was negative for any recurrent disease. She recently had Colonoscopy from her ostomy site.  She went to see her Colorectal Surgeon but ostomy has a good function.  2019 seen and physical examination is unchanged from prior examination. Blood testing remains stable.  2024 She presents for yearly follow up see HPI below; She is now 11 years from resection (APR and chemotherapy/radiation therapy [facility])  [de-identified] : rectal adenocarcinoma [de-identified] : originally clinical T3 [FreeTextEntry1] : status post FOLFOX 6 in 2013 Diagnosis Stage 2 adenocarcinoma of rectum [de-identified] : 11/2022: This is the patient's first visit to our office since July 2019. She has not been hospitalized. the patient has been practicing social distancing and wearing a mask throughout 2020.  She has been feeling well. She is caring for her colostomy and living with her family. She is taking Zetia for elevated triglyceride 04/13/2022 first visit since 11/2020; she has had no signs of recurrence of disease. weight has been stabling no fever no constipation no fatigue. She had two episodes of COVID 19. She did not have scheduled CT scan in January 2021. Returns for annual visit today. 04/2022 see chart note. 04/17/2023 feels same, no hospitalization. No rectal bleeding. No transfusion. Now 121 months form diagnosis and treatment of stage 2 adenocarcinoma of the rectum 2014. 04/22/2024 no hospitalization in past year. No change in medication. No rectal bleeding. She has no weight loss. Appetite has been good. no changes in life style. According to her gastorenterologist she is not requiring a endoscopy thorugh her ostomy: last performed in 2021,

## 2024-04-24 NOTE — ASSESSMENT
[Supportive] : Goals of care discussed with patient: Supportive [Palliative Care Plan] : not applicable at this time [FreeTextEntry1] : MICKIE Herman is a 56-year-old female  She is now 9 years from her APR for rectal carcinoma. She was last seen at our office in 11/2020. She has had a normal CT scan of the chest and abdomen in January 2020 (the sixth anniversary). CEA level were normal at that time and she has no evidence of disease on physical examination today. I am awaiting CEA levels from today.  a CT scan for January 4 2021 was not performed by the patient; Afterward in January  returned to [place].  In the past two years she has had two infections with COVID 19 most recently 12/21. She has had vaccination. ,I will hold off on future surveillance scanning as no standard of care calls for indefinite CT scanning on an annual basis in the absence of symptoms.  Patient is seeing a primary care physician Ayaka Strong () for general medical including hyperlipidemia Request CBC and CEA level today. Follow up in one year Patient seen with Glenna HAAS 04/17/2023 MICKIE Lara is seen for her fourth visit with me now on an annual basis.  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  No evidence of local spread of the rectal adenocarcinoma (p Tx N0 post neoadjuvant chemotherapy radiation therapy) by physical examination and normal CT scan (as obtained by the colon rectal surgeon in 01/2023 ) has shown no evidence of systemic involvement by cancer. She is now 121 months from diagnosis and treatment. RTC in one year.  04/22/2024 now 129 months from diagnosis (07/31/2011). She feels well. physical examination is unchanged. Request CXR and CEA testing today. She is requested to have a endoscopy through the colostomy in follow up. I will refer to GI clinic for follow up endoscopy through the colonoscopy. Physical examination is normal and unchanged from last year's examination; colon ostomy is functional.  No rectal skin lesions post radiation. I discussed imaging; she last had a normal CT scan of chest abdomen and pelvis in 01/2023; she is eleven years form diagnosis. ASCO society and follow up imaging recommendation does not extend beyond five years unless new symptoms occur or changes in blood work are noted. Normal CBC and we are awaiting CMP and CEA today. Request CXR thsi week at her request. If  illness remains in remisssion RTC in 1 year.

## 2024-04-24 NOTE — RESULTS/DATA
[FreeTextEntry1] : CBC WBC 5.34 HGB 12.4  000; I discussed the results of the CT scan from January 2020; she has not kept an appointment for earlier in the year. CEA <0.5 11/2020 11/2022 biopsy cervix intraepithelial lesion 04/17/2023 CBC WBC 8.31 HGB 11.8 MCV 89  000 copy of the report given to the patient with an explanation in [place]. 01/2023 CT scan no evidence of metastatic disease CBC WBC 7.19 HGB 12.4g/dL  000 results given to patient with an explanation.

## 2024-04-24 NOTE — REVIEW OF SYSTEMS
[Vision Problems] : vision problems [Fever] : no fever [Chills] : no chills [Night Sweats] : no night sweats [Fatigue] : no fatigue [Recent Change In Weight] : ~T no recent weight change [Eye Pain] : no eye pain [Red Eyes] : eyes not red [Dry Eyes] : no dryness of the eyes [Dysphagia] : no dysphagia [Loss of Hearing] : no loss of hearing [Nosebleeds] : no nosebleeds [Hoarseness] : no hoarseness [Odynophagia] : no odynophagia [Mucosal Pain] : no mucosal pain [Chest Pain] : no chest pain [Palpitations] : no palpitations [Leg Claudication] : no intermittent leg claudication [Lower Ext Edema] : no lower extremity edema [Shortness Of Breath] : no shortness of breath [Cough] : no cough [SOB on Exertion] : no shortness of breath during exertion [Abdominal Pain] : no abdominal pain [Vomiting] : no vomiting [Constipation] : no constipation [Dysuria] : no dysuria [Incontinence] : no incontinence [Vaginal Discharge] : no vaginal discharge [Dysmenorrhea/Abn Vaginal Bleeding] : no dysmenorrhea/abnormal vaginal bleeding [Joint Pain] : no joint pain [Joint Stiffness] : no joint stiffness [Muscle Pain] : no muscle pain [Muscle Weakness] : no muscle weakness [Skin Rash] : no skin rash [Skin Wound] : no skin wound [Confused] : no confusion [Dizziness] : no dizziness [Fainting] : no fainting [Difficulty Walking] : no difficulty walking [FreeTextEntry3] : glasses [FreeTextEntry7] : good colostomy output

## 2024-04-24 NOTE — PHYSICAL EXAM
[Fully active, able to carry on all pre-disease performance without restriction] : Status 0 - Fully active, able to carry on all pre-disease performance without restriction [Normal] : affect appropriate [Ulcers] : no ulcers [Mucositis] : no mucositis [Thrush] : no thrush [Vesicles] : no vesicles [de-identified] : fundi normal [de-identified] : colostomy bag functioning normal. no abdominal masses [FreeTextEntry1] : closed anus no evidence of skin lesion or nodularity [de-identified] : inguinal LN were negative 99

## 2024-04-24 NOTE — REASON FOR VISIT
[Follow-Up Visit] : a follow-up [Other: ______] : provided by LANETTE [FreeTextEntry2] : follow up for rectal cancer [Interpreters_FullName] : Dr Basim Wilson MD [Interpreters_Relationshiptopatient] : physician [TWNoteComboBox1] : Serbian

## 2024-05-20 ENCOUNTER — OUTPATIENT (OUTPATIENT)
Dept: OUTPATIENT SERVICES | Facility: HOSPITAL | Age: 59
LOS: 1 days | End: 2024-05-20
Payer: MEDICAID

## 2024-05-20 ENCOUNTER — APPOINTMENT (OUTPATIENT)
Dept: RADIOLOGY | Facility: CLINIC | Age: 59
End: 2024-05-20
Payer: MEDICAID

## 2024-05-20 DIAGNOSIS — Z90.722 ACQUIRED ABSENCE OF OVARIES, BILATERAL: Chronic | ICD-10-CM

## 2024-05-20 DIAGNOSIS — Z85.048 PERSONAL HISTORY OF OTHER MALIGNANT NEOPLASM OF RECTUM, RECTOSIGMOID JUNCTION, AND ANUS: ICD-10-CM

## 2024-05-20 DIAGNOSIS — Z09 ENCOUNTER FOR FOLLOW-UP EXAMINATION AFTER COMPLETED TREATMENT FOR CONDITIONS OTHER THAN MALIGNANT NEOPLASM: Chronic | ICD-10-CM

## 2024-05-20 DIAGNOSIS — Z98.89 OTHER SPECIFIED POSTPROCEDURAL STATES: Chronic | ICD-10-CM

## 2024-05-20 DIAGNOSIS — N20.0 CALCULUS OF KIDNEY: Chronic | ICD-10-CM

## 2024-05-20 DIAGNOSIS — Z00.8 ENCOUNTER FOR OTHER GENERAL EXAMINATION: ICD-10-CM

## 2024-05-20 PROCEDURE — 71046 X-RAY EXAM CHEST 2 VIEWS: CPT | Mod: 26

## 2024-05-20 PROCEDURE — 71046 X-RAY EXAM CHEST 2 VIEWS: CPT

## 2024-10-04 ENCOUNTER — EMERGENCY (EMERGENCY)
Facility: HOSPITAL | Age: 59
LOS: 1 days | End: 2024-10-04
Attending: EMERGENCY MEDICINE
Payer: SELF-PAY

## 2024-10-04 VITALS
HEART RATE: 75 BPM | SYSTOLIC BLOOD PRESSURE: 163 MMHG | TEMPERATURE: 98 F | RESPIRATION RATE: 18 BRPM | WEIGHT: 188.94 LBS | DIASTOLIC BLOOD PRESSURE: 79 MMHG | OXYGEN SATURATION: 97 %

## 2024-10-04 VITALS
TEMPERATURE: 98 F | RESPIRATION RATE: 18 BRPM | SYSTOLIC BLOOD PRESSURE: 160 MMHG | HEART RATE: 76 BPM | OXYGEN SATURATION: 99 % | DIASTOLIC BLOOD PRESSURE: 81 MMHG

## 2024-10-04 DIAGNOSIS — Z90.722 ACQUIRED ABSENCE OF OVARIES, BILATERAL: Chronic | ICD-10-CM

## 2024-10-04 DIAGNOSIS — Z09 ENCOUNTER FOR FOLLOW-UP EXAMINATION AFTER COMPLETED TREATMENT FOR CONDITIONS OTHER THAN MALIGNANT NEOPLASM: Chronic | ICD-10-CM

## 2024-10-04 DIAGNOSIS — N20.0 CALCULUS OF KIDNEY: Chronic | ICD-10-CM

## 2024-10-04 DIAGNOSIS — Z98.89 OTHER SPECIFIED POSTPROCEDURAL STATES: Chronic | ICD-10-CM

## 2024-10-04 LAB
ALBUMIN SERPL ELPH-MCNC: 4.9 G/DL — SIGNIFICANT CHANGE UP (ref 3.3–5.2)
ALP SERPL-CCNC: 80 U/L — SIGNIFICANT CHANGE UP (ref 40–120)
ALT FLD-CCNC: 18 U/L — SIGNIFICANT CHANGE UP
ANION GAP SERPL CALC-SCNC: 15 MMOL/L — SIGNIFICANT CHANGE UP (ref 5–17)
AST SERPL-CCNC: 24 U/L — SIGNIFICANT CHANGE UP
BASOPHILS # BLD AUTO: 0.06 K/UL — SIGNIFICANT CHANGE UP (ref 0–0.2)
BASOPHILS NFR BLD AUTO: 0.9 % — SIGNIFICANT CHANGE UP (ref 0–2)
BILIRUB SERPL-MCNC: 0.3 MG/DL — LOW (ref 0.4–2)
BUN SERPL-MCNC: 18 MG/DL — SIGNIFICANT CHANGE UP (ref 8–20)
CALCIUM SERPL-MCNC: 9.7 MG/DL — SIGNIFICANT CHANGE UP (ref 8.4–10.5)
CHLORIDE SERPL-SCNC: 100 MMOL/L — SIGNIFICANT CHANGE UP (ref 96–108)
CO2 SERPL-SCNC: 24 MMOL/L — SIGNIFICANT CHANGE UP (ref 22–29)
CREAT SERPL-MCNC: 0.57 MG/DL — SIGNIFICANT CHANGE UP (ref 0.5–1.3)
EGFR: 106 ML/MIN/1.73M2 — SIGNIFICANT CHANGE UP
EOSINOPHIL # BLD AUTO: 0.16 K/UL — SIGNIFICANT CHANGE UP (ref 0–0.5)
EOSINOPHIL NFR BLD AUTO: 2.3 % — SIGNIFICANT CHANGE UP (ref 0–6)
GLUCOSE SERPL-MCNC: 106 MG/DL — HIGH (ref 70–99)
HCT VFR BLD CALC: 36.5 % — SIGNIFICANT CHANGE UP (ref 34.5–45)
HGB BLD-MCNC: 12.6 G/DL — SIGNIFICANT CHANGE UP (ref 11.5–15.5)
IMM GRANULOCYTES NFR BLD AUTO: 0.6 % — SIGNIFICANT CHANGE UP (ref 0–0.9)
LYMPHOCYTES # BLD AUTO: 1.47 K/UL — SIGNIFICANT CHANGE UP (ref 1–3.3)
LYMPHOCYTES # BLD AUTO: 21.2 % — SIGNIFICANT CHANGE UP (ref 13–44)
MCHC RBC-ENTMCNC: 30 PG — SIGNIFICANT CHANGE UP (ref 27–34)
MCHC RBC-ENTMCNC: 34.5 GM/DL — SIGNIFICANT CHANGE UP (ref 32–36)
MCV RBC AUTO: 86.9 FL — SIGNIFICANT CHANGE UP (ref 80–100)
MONOCYTES # BLD AUTO: 0.42 K/UL — SIGNIFICANT CHANGE UP (ref 0–0.9)
MONOCYTES NFR BLD AUTO: 6 % — SIGNIFICANT CHANGE UP (ref 2–14)
NEUTROPHILS # BLD AUTO: 4.8 K/UL — SIGNIFICANT CHANGE UP (ref 1.8–7.4)
NEUTROPHILS NFR BLD AUTO: 69 % — SIGNIFICANT CHANGE UP (ref 43–77)
PLATELET # BLD AUTO: 290 K/UL — SIGNIFICANT CHANGE UP (ref 150–400)
POTASSIUM SERPL-MCNC: 4.2 MMOL/L — SIGNIFICANT CHANGE UP (ref 3.5–5.3)
POTASSIUM SERPL-SCNC: 4.2 MMOL/L — SIGNIFICANT CHANGE UP (ref 3.5–5.3)
PROT SERPL-MCNC: 7.4 G/DL — SIGNIFICANT CHANGE UP (ref 6.6–8.7)
RBC # BLD: 4.2 M/UL — SIGNIFICANT CHANGE UP (ref 3.8–5.2)
RBC # FLD: 11.9 % — SIGNIFICANT CHANGE UP (ref 10.3–14.5)
SODIUM SERPL-SCNC: 139 MMOL/L — SIGNIFICANT CHANGE UP (ref 135–145)
WBC # BLD: 6.95 K/UL — SIGNIFICANT CHANGE UP (ref 3.8–10.5)
WBC # FLD AUTO: 6.95 K/UL — SIGNIFICANT CHANGE UP (ref 3.8–10.5)

## 2024-10-04 PROCEDURE — 85025 COMPLETE CBC W/AUTO DIFF WBC: CPT

## 2024-10-04 PROCEDURE — 36415 COLL VENOUS BLD VENIPUNCTURE: CPT

## 2024-10-04 PROCEDURE — 71250 CT THORAX DX C-: CPT | Mod: 26,MC

## 2024-10-04 PROCEDURE — 99284 EMERGENCY DEPT VISIT MOD MDM: CPT | Mod: 25

## 2024-10-04 PROCEDURE — 80053 COMPREHEN METABOLIC PANEL: CPT

## 2024-10-04 PROCEDURE — 96374 THER/PROPH/DIAG INJ IV PUSH: CPT

## 2024-10-04 PROCEDURE — 71250 CT THORAX DX C-: CPT | Mod: MC

## 2024-10-04 PROCEDURE — 99284 EMERGENCY DEPT VISIT MOD MDM: CPT

## 2024-10-04 RX ORDER — ACETAMINOPHEN 325 MG
1000 TABLET ORAL ONCE
Refills: 0 | Status: COMPLETED | OUTPATIENT
Start: 2024-10-04 | End: 2024-10-04

## 2024-10-04 RX ORDER — OXYCODONE HYDROCHLORIDE 30 MG/1
1 TABLET, FILM COATED, EXTENDED RELEASE ORAL
Qty: 6 | Refills: 0
Start: 2024-10-04 | End: 2024-10-06

## 2024-10-04 RX ADMIN — Medication 400 MILLIGRAM(S): at 19:56

## 2024-10-04 NOTE — CONSULT NOTE ADULT - SUBJECTIVE AND OBJECTIVE BOX
TRAUMA SURGERY CONSULT     HPI: 59y Female with a PMHx of "anal cancer" s/p resection, cholecystectomy, and hypertriglyceridemia presents to the ED with L 4-7th rib fxs and L elbow ecchymosis.  She was reaching for an object 5 days ago and fell, with her chest wall and elbow striking a railing.  She denies HS or LOC.  She is not on AC.  She is not having difficulty breathing.  Her chest wall pain is 3/10 while on advil at home.  She has full ROM of her elbow and it is not currently hurting her.  She presented after seeing her PCP who ordered x rays and saw the rib fractures.  Elbow XR negative        ROS: 10-system review is otherwise negative except HPI above.      PAST MEDICAL & SURGICAL HISTORY:  Rectal adenocarcinoma  s/p chemo and radiation      Uterine fibroid      Hyperlipidemia      Kidney calculus  s/p surgical excision - right      H/O colonoscopy with polypectomy      S/P colorectal surgery, follow-up exam  s/p abdominoperineal resection and permanent colostomy 1/10/14      S/P BSO (bilateral salpingo-oophorectomy)  1/10/14    Hypertriglyceridemia      FAMILY HISTORY:    Family history not pertinent as reviewed with the patient.    SOCIAL HISTORY:  Denies any toxic habits    ALLERGIES: NKA No Known Allergies      HOME MEDICATIONS: ***  Home Medications:  Fenofibrate      --------------------------------------------------------------------------------------------    PHYSICAL EXAM:   General: NAD, Lying in bed comfortably  Neuro: A+Ox3  HEENT: EOMI  Cardio: RRR  Resp: Non labored breathing on RA, pulls 1250 on IS, weak cough  GI/Abd: Soft, NT/ND, no rebound/guarding, no masses palpated  Vascular: All 4 extremities warm and well perfused.   Pelvis: stable  Musculoskeletal: All 4 extremities moving spontaneously, no limitations, no spinal tenderness. L elbow ecchymosis but is nontender and has full pain free ROM  --------------------------------------------------------------------------------------------    LABS                 12.6   6.95   )----------(  290       ( 04 Oct 2024 20:00 )               36.5      139    |  100    |  18.0   ----------------------------<  106        ( 04 Oct 2024 20:00 )  4.2     |  24.0   |  0.57     Ca    9.7        ( 04 Oct 2024 20:00 )    TPro  7.4    /  Alb  4.9    /  TBili  0.3    /  DBili  x      /  AST  24     /  ALT  18     /  AlkPhos  80     ( 04 Oct 2024 20:00 )    LIVER FUNCTIONS - ( 04 Oct 2024 20:00 )  Alb: 4.9 g/dL / Pro: 7.4 g/dL / ALK PHOS: 80 U/L / ALT: 18 U/L / AST: 24 U/L / GGT: x               CAPILLARY BLOOD GLUCOSE        Urinalysis Basic - ( 04 Oct 2024 20:00 )    Color: x / Appearance: x / SG: x / pH: x  Gluc: 106 mg/dL / Ketone: x  / Bili: x / Urobili: x   Blood: x / Protein: x / Nitrite: x   Leuk Esterase: x / RBC: x / WBC x   Sq Epi: x / Non Sq Epi: x / Bacteria: x          --------------------------------------------------------------------------------------------  IMAGING  < from: CT Chest No Cont (10.04.24 @ 19:26) >  IMPRESSION:    Nondisplaced fracture of the left anterolateral 4th-7th ribs.    No pleural effusion or pneumothorax.    --- End of Report ---    < end of copied text >     TRAUMA SURGERY CONSULT     HPI: 59y Female with a PMHx of "anal cancer" s/p resection, cholecystectomy, and hypertriglyceridemia presents to the ED with L 4-7th rib fxs and L elbow ecchymosis.  She was reaching for an object 5 days ago and fell, with her chest wall and elbow striking a railing.  She denies HS or LOC.  She is not on AC.  She is not having difficulty breathing.  Her chest wall pain is 3/10 while on advil at home.  She has full ROM of her elbow and it is not currently hurting her.  She presented after seeing her PCP who ordered x rays and saw the rib fractures.  Elbow XR negative    Hx taken using ED in person        ROS: 10-system review is otherwise negative except HPI above.      PAST MEDICAL & SURGICAL HISTORY:  Rectal adenocarcinoma  s/p chemo and radiation      Uterine fibroid      Hyperlipidemia      Kidney calculus  s/p surgical excision - right      H/O colonoscopy with polypectomy      S/P colorectal surgery, follow-up exam  s/p abdominoperineal resection and permanent colostomy 1/10/14      S/P BSO (bilateral salpingo-oophorectomy)  1/10/14    Hypertriglyceridemia      FAMILY HISTORY:    Family history not pertinent as reviewed with the patient.    SOCIAL HISTORY:  Denies any toxic habits    ALLERGIES: NKA No Known Allergies      HOME MEDICATIONS: ***  Home Medications:  Fenofibrate      --------------------------------------------------------------------------------------------    PHYSICAL EXAM:   General: NAD, Lying in bed comfortably  Neuro: A+Ox3  HEENT: EOMI  Cardio: RRR  Resp: Non labored breathing on RA, pulls 1250 on IS, weak cough  GI/Abd: Soft, NT/ND, no rebound/guarding, no masses palpated  Vascular: All 4 extremities warm and well perfused.   Pelvis: stable  Musculoskeletal: All 4 extremities moving spontaneously, no limitations, no spinal tenderness. L elbow ecchymosis but is nontender and has full pain free ROM  --------------------------------------------------------------------------------------------    LABS                 12.6   6.95   )----------(  290       ( 04 Oct 2024 20:00 )               36.5      139    |  100    |  18.0   ----------------------------<  106        ( 04 Oct 2024 20:00 )  4.2     |  24.0   |  0.57     Ca    9.7        ( 04 Oct 2024 20:00 )    TPro  7.4    /  Alb  4.9    /  TBili  0.3    /  DBili  x      /  AST  24     /  ALT  18     /  AlkPhos  80     ( 04 Oct 2024 20:00 )    LIVER FUNCTIONS - ( 04 Oct 2024 20:00 )  Alb: 4.9 g/dL / Pro: 7.4 g/dL / ALK PHOS: 80 U/L / ALT: 18 U/L / AST: 24 U/L / GGT: x               CAPILLARY BLOOD GLUCOSE        Urinalysis Basic - ( 04 Oct 2024 20:00 )    Color: x / Appearance: x / SG: x / pH: x  Gluc: 106 mg/dL / Ketone: x  / Bili: x / Urobili: x   Blood: x / Protein: x / Nitrite: x   Leuk Esterase: x / RBC: x / WBC x   Sq Epi: x / Non Sq Epi: x / Bacteria: x          --------------------------------------------------------------------------------------------  IMAGING  < from: CT Chest No Cont (10.04.24 @ 19:26) >  IMPRESSION:    Nondisplaced fracture of the left anterolateral 4th-7th ribs.    No pleural effusion or pneumothorax.    --- End of Report ---    < end of copied text >

## 2024-10-04 NOTE — CONSULT NOTE ADULT - ASSESSMENT
ASSESSMENT: Patient is a 59y old female who presents to the ED 5 days after a fall after her PCP saw rib fxs on outpatient x ray.  ED CT demonstrates L rib 4-7 fractures.  She has a PIC score of 3-3-1 and her pain has been well controlled on advil at home.  She has L elbow ecchymosis but given her pain free ROM and nontenderness it is unlikely to be fractured, outpatient x ray reportedly negative.    PLAN:    - No surgical intervention necessary  - Recommend taking OTC pain medication for pain control, verbal instructions given to pt  - Continue using IS 10x per hour, IS given to patient at bedside, verbal instructions given  - F/u with PCP or return to the ED if she begins to experience dyspnea or fevers  - Dispo per ED    Thank you for involving us in the care of this pt    Pt discussed with Dr. García who agrees with the above

## 2024-10-04 NOTE — ED ADULT TRIAGE NOTE - CHIEF COMPLAINT QUOTE
Pt presents to ED s/p mechanical slip and fall from standing height while at work. C/o right sided pain. Went to Urgent Care and had an XR done which "might show rib fx." Denies LOC and anticoagulation medications.

## 2024-10-04 NOTE — CONSULT NOTE ADULT - NS ATTEND AMEND GEN_ALL_CORE FT
I agree with the above.  59F PMHx anal cancer s/p APR presents after falling 5 days ago.  She went to her PCP who ordered a CXR, saw the rib fractures, and sent her to the ED.  She has a PIC score of 7.  Pain is controlled.  Patient can be DC home safely.

## 2024-10-04 NOTE — ED PROVIDER NOTE - PROGRESS NOTE DETAILS
imaging showed consective non displaced rib fx 4th-7th. pt made aware of results. called for surg cs

## 2024-10-04 NOTE — ED PROVIDER NOTE - CLINICAL SUMMARY MEDICAL DECISION MAKING FREE TEXT BOX
Jolene: 58 y/o female hx colorectal cancer with colostomy, currrently in remission sent from   for multiple rib fractures on xray., pt fell 5 days ago at work, onto left side, hitting ribs and left elbow. neg elbow xray at , but reported fractures. denies sob, no f/c. no cough. no abd pain. no hematuria. no other complaints. denies injuring head.ttp to left ribs, will get ct. pain control, reassess Jolene: 60 y/o female hx colorectal cancer with colostomy, currrently in remission sent from   for multiple rib fractures on xray., pt fell 5 days ago at work, onto left side, hitting ribs and left elbow. neg elbow xray at , but reported fractures. denies sob, no f/c. no cough. no abd pain. no hematuria. no other complaints. denies injuring head.ttp to left ribs, will get ct. pain control, reassess    SAMINA Ochoa: pt reported improvement of sx after receiving meds. labs wnl, no leukocytosis, stable H+H. imaging showed non displaced rib fx 4th-7th. trauma surg cs, advised pt is stable for dc. sent meds to pharmacy. discussed supportive care measures and return precautions. advised to f.u with pcp. pt verbalized understanding and agreement Jolene: 60 y/o female hx colorectal cancer with colostomy, currrently in remission sent from   for multiple rib fractures on xray., pt fell 5 days ago at work, onto left side, hitting ribs and left elbow. neg elbow xray at , but reported fractures. denies sob, no f/c. no cough. no abd pain. no hematuria. no other complaints. denies injuring head.ttp to left ribs, will get ct. pain control, reassess    SAMINA Ochoa: pt reported improvement of sx after receiving meds. labs wnl, no leukocytosis, stable H+H. imaging showed non displaced rib fx 4th-7th. trauma surg cs, advised pt is stable for dc. sent meds to pharmacy. provided pt with incentive spirometer. discussed supportive care measures and return precautions. advised to f.u with pcp. pt verbalized understanding and agreement

## 2024-10-04 NOTE — ED PROVIDER NOTE - PATIENT PORTAL LINK FT
You can access the FollowMyHealth Patient Portal offered by [facility] by registering at the following website: [email or web address]. By joining [facility]’s FollowMyHealth portal, you will also be able to view your health information using other applications (apps) compatible with our system.

## 2024-10-04 NOTE — ED PROVIDER NOTE - ATTENDING APP SHARED VISIT CONTRIBUTION OF CARE
Jolene: I performed a face to face bedside interview with patient regarding history of present illness, review of symptoms and past medical history. I completed an independent physical exam and ordered tests/medications as needed.  I have discussed patient's plan of care with advanced care provider. The advanced care provider assisted in  executing the discussed plan. I was available for any questions or issues that may have arose during the execution of the plan of care.

## 2024-10-04 NOTE — ED PROVIDER NOTE - OBJECTIVE STATEMENT
60 y/o female hx colorectal cancer with colostomy, currrently in remission sent from   for multiple rib fractures on xray., pt fell 5 days ago at work, onto left side, hitting ribs and left elbow. neg elbow xray at , but reported fractures. denies sob, no f/c. no cough. no abd pain. no hematuria. no other complaints. denies injuring head.

## 2024-10-04 NOTE — ED ADULT NURSE NOTE - CHIEF COMPLAINT QUOTE
I reviewed with the resident the medical history and the resident's findings on the physical examination. I discussed with the resident the patient's diagnosis and concur with the plan. Pt presents to ED s/p mechanical slip and fall from standing height while at work. C/o right sided pain. Went to Urgent Care and had an XR done which "might show rib fx." Denies LOC and anticoagulation medications.

## 2024-10-04 NOTE — ED PROVIDER NOTE - PHYSICAL EXAMINATION
Gen: No acute distress, non toxic  HEENT: Mucous membranes moist, pink conjunctivae, EOMI. ncat  Neck: no midline ttp  CV: RRR, nl s1/s2.  Resp: CTAB, normal rate and effort  GI: Abdomen soft, NT, ND. No rebound, no guarding  : No CVAT  Neuro: A&O x 3, moving all 4 extremities  MSK: ttp left anterior/lateral lower rib region. bruising to left elbow with full rom, no deformity. neurovascularly intact. no other ttp to extremities  Skin: No rashes. intact and perfused.

## 2025-01-22 ENCOUNTER — APPOINTMENT (OUTPATIENT)
Dept: COLORECTAL SURGERY | Facility: CLINIC | Age: 60
End: 2025-01-22
Payer: MEDICAID

## 2025-01-22 VITALS
RESPIRATION RATE: 16 BRPM | BODY MASS INDEX: 29.37 KG/M2 | TEMPERATURE: 98.4 F | SYSTOLIC BLOOD PRESSURE: 133 MMHG | WEIGHT: 172 LBS | HEART RATE: 78 BPM | DIASTOLIC BLOOD PRESSURE: 86 MMHG | OXYGEN SATURATION: 95 % | HEIGHT: 64 IN

## 2025-01-22 DIAGNOSIS — Z85.048 PERSONAL HISTORY OF OTHER MALIGNANT NEOPLASM OF RECTUM, RECTOSIGMOID JUNCTION, AND ANUS: ICD-10-CM

## 2025-01-22 DIAGNOSIS — Z78.9 OTHER SPECIFIED HEALTH STATUS: ICD-10-CM

## 2025-01-22 PROCEDURE — 99213 OFFICE O/P EST LOW 20 MIN: CPT

## 2025-01-28 ENCOUNTER — OUTPATIENT (OUTPATIENT)
Dept: OUTPATIENT SERVICES | Facility: HOSPITAL | Age: 60
LOS: 1 days | End: 2025-01-28
Payer: MEDICAID

## 2025-01-28 ENCOUNTER — TRANSCRIPTION ENCOUNTER (OUTPATIENT)
Age: 60
End: 2025-01-28

## 2025-01-28 DIAGNOSIS — N20.0 CALCULUS OF KIDNEY: Chronic | ICD-10-CM

## 2025-01-28 DIAGNOSIS — M17.10 UNILATERAL PRIMARY OSTEOARTHRITIS, UNSPECIFIED KNEE: ICD-10-CM

## 2025-01-28 DIAGNOSIS — M25.562 PAIN IN LEFT KNEE: ICD-10-CM

## 2025-01-28 DIAGNOSIS — Z90.722 ACQUIRED ABSENCE OF OVARIES, BILATERAL: Chronic | ICD-10-CM

## 2025-01-28 DIAGNOSIS — Z98.89 OTHER SPECIFIED POSTPROCEDURAL STATES: Chronic | ICD-10-CM

## 2025-01-28 DIAGNOSIS — M25.561 PAIN IN RIGHT KNEE: ICD-10-CM

## 2025-01-28 DIAGNOSIS — Z09 ENCOUNTER FOR FOLLOW-UP EXAMINATION AFTER COMPLETED TREATMENT FOR CONDITIONS OTHER THAN MALIGNANT NEOPLASM: Chronic | ICD-10-CM

## 2025-01-28 PROCEDURE — 20610 DRAIN/INJ JOINT/BURSA W/O US: CPT | Mod: RT

## 2025-01-28 PROCEDURE — 77002 NEEDLE LOCALIZATION BY XRAY: CPT

## 2025-02-04 ENCOUNTER — OUTPATIENT (OUTPATIENT)
Dept: OUTPATIENT SERVICES | Facility: HOSPITAL | Age: 60
LOS: 1 days | Discharge: HOME | End: 2025-02-04

## 2025-02-04 ENCOUNTER — APPOINTMENT (OUTPATIENT)
Dept: COLORECTAL SURGERY | Facility: CLINIC | Age: 60
End: 2025-02-04
Payer: MEDICAID

## 2025-02-04 DIAGNOSIS — Z98.89 OTHER SPECIFIED POSTPROCEDURAL STATES: Chronic | ICD-10-CM

## 2025-02-04 DIAGNOSIS — Z09 ENCOUNTER FOR FOLLOW-UP EXAMINATION AFTER COMPLETED TREATMENT FOR CONDITIONS OTHER THAN MALIGNANT NEOPLASM: Chronic | ICD-10-CM

## 2025-02-04 DIAGNOSIS — D25.9 LEIOMYOMA OF UTERUS, UNSPECIFIED: ICD-10-CM

## 2025-02-04 DIAGNOSIS — C19 MALIGNANT NEOPLASM OF RECTOSIGMOID JUNCTION: ICD-10-CM

## 2025-02-04 DIAGNOSIS — Z90.722 ACQUIRED ABSENCE OF OVARIES, BILATERAL: Chronic | ICD-10-CM

## 2025-02-04 DIAGNOSIS — Z85.048 PERSONAL HISTORY OF OTHER MALIGNANT NEOPLASM OF RECTUM, RECTOSIGMOID JUNCTION, AND ANUS: ICD-10-CM

## 2025-02-04 DIAGNOSIS — E78.1 PURE HYPERGLYCERIDEMIA: ICD-10-CM

## 2025-02-04 DIAGNOSIS — K92.2 GASTROINTESTINAL HEMORRHAGE, UNSPECIFIED: ICD-10-CM

## 2025-02-04 DIAGNOSIS — N20.0 CALCULUS OF KIDNEY: Chronic | ICD-10-CM

## 2025-02-04 DIAGNOSIS — E78.5 HYPERLIPIDEMIA, UNSPECIFIED: ICD-10-CM

## 2025-02-04 PROCEDURE — 44388 COLONOSCOPY THRU STOMA SPX: CPT

## 2025-02-05 ENCOUNTER — RESULT REVIEW (OUTPATIENT)
Age: 60
End: 2025-02-05

## 2025-02-05 ENCOUNTER — APPOINTMENT (OUTPATIENT)
Dept: HEMATOLOGY ONCOLOGY | Facility: CLINIC | Age: 60
End: 2025-02-05

## 2025-02-05 ENCOUNTER — APPOINTMENT (OUTPATIENT)
Dept: HEMATOLOGY ONCOLOGY | Facility: CLINIC | Age: 60
End: 2025-02-05
Payer: MEDICAID

## 2025-02-05 ENCOUNTER — NON-APPOINTMENT (OUTPATIENT)
Age: 60
End: 2025-02-05

## 2025-02-05 VITALS
DIASTOLIC BLOOD PRESSURE: 79 MMHG | OXYGEN SATURATION: 96 % | TEMPERATURE: 96.9 F | WEIGHT: 173.7 LBS | HEART RATE: 79 BPM | BODY MASS INDEX: 29.82 KG/M2 | RESPIRATION RATE: 16 BRPM | SYSTOLIC BLOOD PRESSURE: 133 MMHG

## 2025-02-05 DIAGNOSIS — D23.9 OTHER BENIGN NEOPLASM OF SKIN, UNSPECIFIED: ICD-10-CM

## 2025-02-05 DIAGNOSIS — Z85.048 PERSONAL HISTORY OF OTHER MALIGNANT NEOPLASM OF RECTUM, RECTOSIGMOID JUNCTION, AND ANUS: ICD-10-CM

## 2025-02-05 DIAGNOSIS — G62.0 DRUG-INDUCED POLYNEUROPATHY: ICD-10-CM

## 2025-02-05 LAB
ALBUMIN SERPL ELPH-MCNC: 4.5 G/DL
ALP BLD-CCNC: 75 U/L
ALT SERPL-CCNC: 18 U/L
ANION GAP SERPL CALC-SCNC: 12 MMOL/L
AST SERPL-CCNC: 20 U/L
BASOPHILS # BLD AUTO: 0.03 K/UL — SIGNIFICANT CHANGE UP (ref 0–0.2)
BASOPHILS NFR BLD AUTO: 0.5 % — SIGNIFICANT CHANGE UP (ref 0–2)
BILIRUB SERPL-MCNC: 0.3 MG/DL
BUN SERPL-MCNC: 14 MG/DL
CALCIUM SERPL-MCNC: 9.7 MG/DL
CHLORIDE SERPL-SCNC: 105 MMOL/L
CO2 SERPL-SCNC: 24 MMOL/L
CREAT SERPL-MCNC: 0.62 MG/DL
EGFR: 104 ML/MIN/1.73M2
EOSINOPHIL # BLD AUTO: 0.09 K/UL — SIGNIFICANT CHANGE UP (ref 0–0.5)
EOSINOPHIL NFR BLD AUTO: 1.4 % — SIGNIFICANT CHANGE UP (ref 0–6)
GLUCOSE SERPL-MCNC: 99 MG/DL
HCT VFR BLD CALC: 35.2 % — SIGNIFICANT CHANGE UP (ref 34.5–45)
HGB BLD-MCNC: 12.1 G/DL — SIGNIFICANT CHANGE UP (ref 11.5–15.5)
IMM GRANULOCYTES NFR BLD AUTO: 0.8 % — SIGNIFICANT CHANGE UP (ref 0–0.9)
LYMPHOCYTES # BLD AUTO: 1.4 K/UL — SIGNIFICANT CHANGE UP (ref 1–3.3)
LYMPHOCYTES # BLD AUTO: 22.4 % — SIGNIFICANT CHANGE UP (ref 13–44)
MCHC RBC-ENTMCNC: 30 PG — SIGNIFICANT CHANGE UP (ref 27–34)
MCHC RBC-ENTMCNC: 34.4 G/DL — SIGNIFICANT CHANGE UP (ref 32–36)
MCV RBC AUTO: 87.1 FL — SIGNIFICANT CHANGE UP (ref 80–100)
MONOCYTES # BLD AUTO: 0.39 K/UL — SIGNIFICANT CHANGE UP (ref 0–0.9)
MONOCYTES NFR BLD AUTO: 6.2 % — SIGNIFICANT CHANGE UP (ref 2–14)
NEUTROPHILS # BLD AUTO: 4.29 K/UL — SIGNIFICANT CHANGE UP (ref 1.8–7.4)
NEUTROPHILS NFR BLD AUTO: 68.7 % — SIGNIFICANT CHANGE UP (ref 43–77)
NRBC # BLD: 0 /100 WBCS — SIGNIFICANT CHANGE UP (ref 0–0)
NRBC BLD-RTO: 0 /100 WBCS — SIGNIFICANT CHANGE UP (ref 0–0)
PLATELET # BLD AUTO: 253 K/UL — SIGNIFICANT CHANGE UP (ref 150–400)
POTASSIUM SERPL-SCNC: 4.8 MMOL/L
PROT SERPL-MCNC: 7 G/DL
RBC # BLD: 4.04 M/UL — SIGNIFICANT CHANGE UP (ref 3.8–5.2)
RBC # FLD: 12 % — SIGNIFICANT CHANGE UP (ref 10.3–14.5)
SODIUM SERPL-SCNC: 142 MMOL/L
WBC # BLD: 6.25 K/UL — SIGNIFICANT CHANGE UP (ref 3.8–10.5)
WBC # FLD AUTO: 6.25 K/UL — SIGNIFICANT CHANGE UP (ref 3.8–10.5)

## 2025-02-05 PROCEDURE — 99215 OFFICE O/P EST HI 40 MIN: CPT

## 2025-02-05 PROCEDURE — G2211 COMPLEX E/M VISIT ADD ON: CPT | Mod: NC

## 2025-02-06 LAB — CEA SERPL-MCNC: <0.6 NG/ML

## 2025-02-10 ENCOUNTER — OFFICE (OUTPATIENT)
Dept: URBAN - METROPOLITAN AREA CLINIC 63 | Facility: CLINIC | Age: 60
Setting detail: OPHTHALMOLOGY
End: 2025-02-10
Payer: MEDICAID

## 2025-02-10 DIAGNOSIS — H16.223: ICD-10-CM

## 2025-02-10 DIAGNOSIS — H02.831: ICD-10-CM

## 2025-02-10 DIAGNOSIS — H43.393: ICD-10-CM

## 2025-02-10 DIAGNOSIS — H52.4: ICD-10-CM

## 2025-02-10 DIAGNOSIS — H02.834: ICD-10-CM

## 2025-02-10 PROBLEM — H25.813 COMBINED FORMS AGE RELATED CATARACT; BOTH EYES: Status: ACTIVE | Noted: 2025-02-10

## 2025-02-10 PROCEDURE — 92014 COMPRE OPH EXAM EST PT 1/>: CPT | Performed by: INTERNAL MEDICINE

## 2025-02-10 PROCEDURE — 92015 DETERMINE REFRACTIVE STATE: CPT | Performed by: INTERNAL MEDICINE

## 2025-02-10 ASSESSMENT — REFRACTION_MANIFEST
OS_ADD: +2.25
OS_ADD: +2.50
OD_AXIS: 180
OD_AXIS: 180
OS_VA1: 20/20
OD_SPHERE: +2.75
OS_SPHERE: +2.50
OD_ADD: +2.25
OS_CYLINDER: -0.50
OD_SPHERE: +2.50
OS_AXIS: 100
OD_VA1: 20/25
OS_SPHERE: +3.00
OD_ADD: +2.50
OU_VA: 20/20
OD_CYLINDER: SPHERE
OD_VA1: 20/20
OS_CYLINDER: -0.25
OD_CYLINDER: -0.50
OS_VA1: 20/20
OS_AXIS: 140

## 2025-02-10 ASSESSMENT — REFRACTION_CURRENTRX
OD_AXIS: 180
OS_VPRISM_DIRECTION: SV
OS_AXIS: 180
OS_CYLINDER: 0.00
OS_OVR_VA: 20/
OD_CYLINDER: 0.00
OD_SPHERE: +2.00
OD_VPRISM_DIRECTION: SV
OS_SPHERE: +2.00
OD_OVR_VA: 20/

## 2025-02-10 ASSESSMENT — LID EXAM ASSESSMENTS
OD_MEIBOMITIS: RLL 1+
OD_DERMATOCHALASIS: 1+
OS_MEIBOMITIS: LLL 1+
OS_DERMATOCHALASIS: 1+

## 2025-02-10 ASSESSMENT — REFRACTION_AUTOREFRACTION
OS_CYLINDER: -0.50
OS_SPHERE: +3.00
OD_SPHERE: +2.75
OS_AXIS: 098

## 2025-02-10 ASSESSMENT — CONFRONTATIONAL VISUAL FIELD TEST (CVF)
OS_FINDINGS: FULL
OD_FINDINGS: FULL

## 2025-02-10 ASSESSMENT — KERATOMETRY
OD_K2POWER_DIOPTERS: 44.75
OS_AXISANGLE_DEGREES: 082
OS_K2POWER_DIOPTERS: 44.00
OD_AXISANGLE_DEGREES: 090
OS_K1POWER_DIOPTERS: 43.75
OD_K1POWER_DIOPTERS: 43.50

## 2025-02-10 ASSESSMENT — VISUAL ACUITY
OS_BCVA: 20/25-1
OD_BCVA: 20/20-1

## 2025-02-10 ASSESSMENT — TONOMETRY
OD_IOP_MMHG: 19
OS_IOP_MMHG: 19

## 2025-02-10 ASSESSMENT — SUPERFICIAL PUNCTATE KERATITIS (SPK)
OS_SPK: 1+
OD_SPK: 1+

## 2025-02-10 ASSESSMENT — LID POSITION - DERMATOCHALASIS
OS_DERMATOCHALASIS: LUL 1+
OD_DERMATOCHALASIS: RUL 1+

## 2025-02-12 ENCOUNTER — APPOINTMENT (OUTPATIENT)
Dept: CT IMAGING | Facility: CLINIC | Age: 60
End: 2025-02-12

## 2025-02-12 ENCOUNTER — OUTPATIENT (OUTPATIENT)
Dept: OUTPATIENT SERVICES | Facility: HOSPITAL | Age: 60
LOS: 1 days | End: 2025-02-12
Payer: MEDICAID

## 2025-02-12 DIAGNOSIS — Z98.89 OTHER SPECIFIED POSTPROCEDURAL STATES: Chronic | ICD-10-CM

## 2025-02-12 DIAGNOSIS — Z90.722 ACQUIRED ABSENCE OF OVARIES, BILATERAL: Chronic | ICD-10-CM

## 2025-02-12 DIAGNOSIS — N20.0 CALCULUS OF KIDNEY: Chronic | ICD-10-CM

## 2025-02-12 DIAGNOSIS — Z09 ENCOUNTER FOR FOLLOW-UP EXAMINATION AFTER COMPLETED TREATMENT FOR CONDITIONS OTHER THAN MALIGNANT NEOPLASM: Chronic | ICD-10-CM

## 2025-02-12 DIAGNOSIS — Z85.048 PERSONAL HISTORY OF OTHER MALIGNANT NEOPLASM OF RECTUM, RECTOSIGMOID JUNCTION, AND ANUS: ICD-10-CM

## 2025-02-12 PROCEDURE — 72193 CT PELVIS W/DYE: CPT | Mod: 26

## 2025-02-26 ENCOUNTER — INPATIENT (INPATIENT)
Facility: HOSPITAL | Age: 60
LOS: 0 days | Discharge: HOME | DRG: 683 | End: 2025-02-27
Attending: HOSPITALIST | Admitting: HOSPITALIST
Payer: MEDICAID

## 2025-02-26 VITALS
HEART RATE: 113 BPM | OXYGEN SATURATION: 99 % | TEMPERATURE: 98 F | SYSTOLIC BLOOD PRESSURE: 112 MMHG | WEIGHT: 170.2 LBS | RESPIRATION RATE: 20 BRPM | DIASTOLIC BLOOD PRESSURE: 71 MMHG | HEIGHT: 64 IN

## 2025-02-26 DIAGNOSIS — Z09 ENCOUNTER FOR FOLLOW-UP EXAMINATION AFTER COMPLETED TREATMENT FOR CONDITIONS OTHER THAN MALIGNANT NEOPLASM: Chronic | ICD-10-CM

## 2025-02-26 DIAGNOSIS — N20.0 CALCULUS OF KIDNEY: Chronic | ICD-10-CM

## 2025-02-26 DIAGNOSIS — Z98.89 OTHER SPECIFIED POSTPROCEDURAL STATES: Chronic | ICD-10-CM

## 2025-02-26 DIAGNOSIS — Z90.722 ACQUIRED ABSENCE OF OVARIES, BILATERAL: Chronic | ICD-10-CM

## 2025-02-26 LAB
ALBUMIN SERPL ELPH-MCNC: 3.9 G/DL — SIGNIFICANT CHANGE UP (ref 3.3–5.2)
ALBUMIN SERPL ELPH-MCNC: 5 G/DL — SIGNIFICANT CHANGE UP (ref 3.3–5.2)
ALP SERPL-CCNC: 49 U/L — SIGNIFICANT CHANGE UP (ref 40–120)
ALP SERPL-CCNC: 63 U/L — SIGNIFICANT CHANGE UP (ref 40–120)
ALT FLD-CCNC: 14 U/L — SIGNIFICANT CHANGE UP
ALT FLD-CCNC: 20 U/L — SIGNIFICANT CHANGE UP
ANION GAP SERPL CALC-SCNC: 17 MMOL/L — SIGNIFICANT CHANGE UP (ref 5–17)
ANION GAP SERPL CALC-SCNC: 27 MMOL/L — HIGH (ref 5–17)
APPEARANCE UR: ABNORMAL
AST SERPL-CCNC: 20 U/L — SIGNIFICANT CHANGE UP
AST SERPL-CCNC: 26 U/L — SIGNIFICANT CHANGE UP
BACTERIA # UR AUTO: ABNORMAL /HPF
BASOPHILS # BLD AUTO: 0.02 K/UL — SIGNIFICANT CHANGE UP (ref 0–0.2)
BASOPHILS NFR BLD AUTO: 0.1 % — SIGNIFICANT CHANGE UP (ref 0–2)
BILIRUB SERPL-MCNC: 0.2 MG/DL — LOW (ref 0.4–2)
BILIRUB SERPL-MCNC: 0.4 MG/DL — SIGNIFICANT CHANGE UP (ref 0.4–2)
BILIRUB UR-MCNC: NEGATIVE — SIGNIFICANT CHANGE UP
BUN SERPL-MCNC: 43.6 MG/DL — HIGH (ref 8–20)
BUN SERPL-MCNC: 52.3 MG/DL — HIGH (ref 8–20)
CALCIUM SERPL-MCNC: 7.4 MG/DL — LOW (ref 8.4–10.5)
CALCIUM SERPL-MCNC: 9.2 MG/DL — SIGNIFICANT CHANGE UP (ref 8.4–10.5)
CAST: >63 /LPF — HIGH (ref 0–4)
CHLORIDE SERPL-SCNC: 101 MMOL/L — SIGNIFICANT CHANGE UP (ref 96–108)
CHLORIDE SERPL-SCNC: 89 MMOL/L — LOW (ref 96–108)
CO2 SERPL-SCNC: 15 MMOL/L — LOW (ref 22–29)
CO2 SERPL-SCNC: 18 MMOL/L — LOW (ref 22–29)
COLOR SPEC: SIGNIFICANT CHANGE UP
CREAT SERPL-MCNC: 1.89 MG/DL — HIGH (ref 0.5–1.3)
CREAT SERPL-MCNC: 3.2 MG/DL — HIGH (ref 0.5–1.3)
DIFF PNL FLD: NEGATIVE — SIGNIFICANT CHANGE UP
EGFR: 16 ML/MIN/1.73M2 — LOW
EGFR: 31 ML/MIN/1.73M2 — LOW
EOSINOPHIL # BLD AUTO: 0 K/UL — SIGNIFICANT CHANGE UP (ref 0–0.5)
EOSINOPHIL NFR BLD AUTO: 0 % — SIGNIFICANT CHANGE UP (ref 0–6)
GLUCOSE SERPL-MCNC: 124 MG/DL — HIGH (ref 70–99)
GLUCOSE SERPL-MCNC: 175 MG/DL — HIGH (ref 70–99)
GLUCOSE UR QL: NEGATIVE MG/DL — SIGNIFICANT CHANGE UP
HCT VFR BLD CALC: 43.3 % — SIGNIFICANT CHANGE UP (ref 34.5–45)
HGB BLD-MCNC: 14.5 G/DL — SIGNIFICANT CHANGE UP (ref 11.5–15.5)
IMM GRANULOCYTES # BLD AUTO: 0.13 K/UL — HIGH (ref 0–0.07)
IMM GRANULOCYTES NFR BLD AUTO: 0.9 % — SIGNIFICANT CHANGE UP (ref 0–0.9)
KETONES UR-MCNC: ABNORMAL MG/DL
LEUKOCYTE ESTERASE UR-ACNC: ABNORMAL
LIDOCAIN IGE QN: 19 U/L — LOW (ref 22–51)
LYMPHOCYTES # BLD AUTO: 0.55 K/UL — LOW (ref 1–3.3)
LYMPHOCYTES NFR BLD AUTO: 4 % — LOW (ref 13–44)
MCHC RBC-ENTMCNC: 28.9 PG — SIGNIFICANT CHANGE UP (ref 27–34)
MCHC RBC-ENTMCNC: 33.5 G/DL — SIGNIFICANT CHANGE UP (ref 32–36)
MCV RBC AUTO: 86.4 FL — SIGNIFICANT CHANGE UP (ref 80–100)
MONOCYTES # BLD AUTO: 1.06 K/UL — HIGH (ref 0–0.9)
MONOCYTES NFR BLD AUTO: 7.7 % — SIGNIFICANT CHANGE UP (ref 2–14)
NEUTROPHILS # BLD AUTO: 11.98 K/UL — HIGH (ref 1.8–7.4)
NEUTROPHILS NFR BLD AUTO: 87.3 % — HIGH (ref 43–77)
NITRITE UR-MCNC: NEGATIVE — SIGNIFICANT CHANGE UP
NRBC # BLD AUTO: 0 K/UL — SIGNIFICANT CHANGE UP (ref 0–0)
NRBC # FLD: 0 K/UL — SIGNIFICANT CHANGE UP (ref 0–0)
NRBC BLD AUTO-RTO: 0 /100 WBCS — SIGNIFICANT CHANGE UP (ref 0–0)
PH UR: 5 — SIGNIFICANT CHANGE UP (ref 5–8)
PLATELET # BLD AUTO: 325 K/UL — SIGNIFICANT CHANGE UP (ref 150–400)
PMV BLD: 9.6 FL — SIGNIFICANT CHANGE UP (ref 7–13)
POTASSIUM SERPL-MCNC: 3.3 MMOL/L — LOW (ref 3.5–5.3)
POTASSIUM SERPL-MCNC: 3.8 MMOL/L — SIGNIFICANT CHANGE UP (ref 3.5–5.3)
POTASSIUM SERPL-SCNC: 3.3 MMOL/L — LOW (ref 3.5–5.3)
POTASSIUM SERPL-SCNC: 3.8 MMOL/L — SIGNIFICANT CHANGE UP (ref 3.5–5.3)
PROT SERPL-MCNC: 6.8 G/DL — SIGNIFICANT CHANGE UP (ref 6.6–8.7)
PROT SERPL-MCNC: 9.1 G/DL — HIGH (ref 6.6–8.7)
PROT UR-MCNC: 100 MG/DL
RAPID RVP RESULT: SIGNIFICANT CHANGE UP
RBC # BLD: 5.01 M/UL — SIGNIFICANT CHANGE UP (ref 3.8–5.2)
RBC # FLD: 12.4 % — SIGNIFICANT CHANGE UP (ref 10.3–14.5)
RBC CASTS # UR COMP ASSIST: 10 /HPF — HIGH (ref 0–4)
SARS-COV-2 RNA SPEC QL NAA+PROBE: SIGNIFICANT CHANGE UP
SODIUM SERPL-SCNC: 130 MMOL/L — LOW (ref 135–145)
SODIUM SERPL-SCNC: 136 MMOL/L — SIGNIFICANT CHANGE UP (ref 135–145)
SP GR SPEC: 1.02 — SIGNIFICANT CHANGE UP (ref 1–1.03)
SQUAMOUS # UR AUTO: >36 /HPF — HIGH (ref 0–5)
UROBILINOGEN FLD QL: 0.2 MG/DL — SIGNIFICANT CHANGE UP (ref 0.2–1)
WBC # BLD: 13.74 K/UL — HIGH (ref 3.8–10.5)
WBC # FLD AUTO: 13.74 K/UL — HIGH (ref 3.8–10.5)
WBC UR QL: 17 /HPF — HIGH (ref 0–5)

## 2025-02-26 PROCEDURE — 99285 EMERGENCY DEPT VISIT HI MDM: CPT

## 2025-02-26 PROCEDURE — 74176 CT ABD & PELVIS W/O CONTRAST: CPT | Mod: 26

## 2025-02-26 PROCEDURE — 71045 X-RAY EXAM CHEST 1 VIEW: CPT | Mod: 26

## 2025-02-26 RX ORDER — CEFTRIAXONE 500 MG/1
1000 INJECTION, POWDER, FOR SOLUTION INTRAMUSCULAR; INTRAVENOUS ONCE
Refills: 0 | Status: DISCONTINUED | OUTPATIENT
Start: 2025-02-26 | End: 2025-02-26

## 2025-02-26 RX ORDER — ONDANSETRON HCL/PF 4 MG/2 ML
4 VIAL (ML) INJECTION ONCE
Refills: 0 | Status: COMPLETED | OUTPATIENT
Start: 2025-02-26 | End: 2025-02-26

## 2025-02-26 RX ORDER — CEFTRIAXONE 500 MG/1
1000 INJECTION, POWDER, FOR SOLUTION INTRAMUSCULAR; INTRAVENOUS ONCE
Refills: 0 | Status: COMPLETED | OUTPATIENT
Start: 2025-02-26 | End: 2025-02-26

## 2025-02-26 RX ORDER — ACETAMINOPHEN 500 MG/5ML
1000 LIQUID (ML) ORAL ONCE
Refills: 0 | Status: COMPLETED | OUTPATIENT
Start: 2025-02-26 | End: 2025-02-26

## 2025-02-26 RX ADMIN — Medication 1000 MILLIGRAM(S): at 19:47

## 2025-02-26 RX ADMIN — Medication 2000 MILLILITER(S): at 20:25

## 2025-02-26 RX ADMIN — Medication 1000 MILLILITER(S): at 17:33

## 2025-02-26 RX ADMIN — Medication 400 MILLIGRAM(S): at 17:33

## 2025-02-26 RX ADMIN — CEFTRIAXONE 1000 MILLIGRAM(S): 500 INJECTION, POWDER, FOR SOLUTION INTRAMUSCULAR; INTRAVENOUS at 19:57

## 2025-02-26 RX ADMIN — Medication 4 MILLIGRAM(S): at 17:33

## 2025-02-26 NOTE — ED ADULT NURSE NOTE - OBJECTIVE STATEMENT
Pt A&Ox4 ambulatory to supertrack c/o abdominal discomfort, nausea, emesis and diarrhea in colostomy bag, hx of colon ca. Airway patent. Respirations even and unlabored.

## 2025-02-26 NOTE — ED PROVIDER NOTE - CLINICAL SUMMARY MEDICAL DECISION MAKING FREE TEXT BOX
The patient presents with abd discomfort with nausea vomiting and diarrhea and has new ARF most likely due to dehydration but also has UTI and will admit for further evaluation

## 2025-02-26 NOTE — ED PROVIDER NOTE - OBJECTIVE STATEMENT
The patient is a 59 year old female presents with nausea, vomiting and diarrhea with abd pain with history of colon cancer with colostomy bag

## 2025-02-26 NOTE — ED ADULT TRIAGE NOTE - CHIEF COMPLAINT QUOTE
Pt c/o nausea, vomiting and diarrhea in colostomy bag for the last 2 days. Pt unable to tolerate anything PO and feels very dehydrated

## 2025-02-27 ENCOUNTER — TRANSCRIPTION ENCOUNTER (OUTPATIENT)
Age: 60
End: 2025-02-27

## 2025-02-27 VITALS
SYSTOLIC BLOOD PRESSURE: 121 MMHG | RESPIRATION RATE: 16 BRPM | HEART RATE: 76 BPM | TEMPERATURE: 98 F | OXYGEN SATURATION: 96 % | DIASTOLIC BLOOD PRESSURE: 76 MMHG

## 2025-02-27 DIAGNOSIS — K52.9 NONINFECTIVE GASTROENTERITIS AND COLITIS, UNSPECIFIED: ICD-10-CM

## 2025-02-27 DIAGNOSIS — N17.9 ACUTE KIDNEY FAILURE, UNSPECIFIED: ICD-10-CM

## 2025-02-27 LAB
ANION GAP SERPL CALC-SCNC: 13 MMOL/L — SIGNIFICANT CHANGE UP (ref 5–17)
BUN SERPL-MCNC: 28.1 MG/DL — HIGH (ref 8–20)
CALCIUM SERPL-MCNC: 8.4 MG/DL — SIGNIFICANT CHANGE UP (ref 8.4–10.5)
CHLORIDE SERPL-SCNC: 105 MMOL/L — SIGNIFICANT CHANGE UP (ref 96–108)
CO2 SERPL-SCNC: 21 MMOL/L — LOW (ref 22–29)
CREAT SERPL-MCNC: 0.91 MG/DL — SIGNIFICANT CHANGE UP (ref 0.5–1.3)
EGFR: 74 ML/MIN/1.73M2 — SIGNIFICANT CHANGE UP
GI PCR PANEL: DETECTED
GLUCOSE SERPL-MCNC: 108 MG/DL — HIGH (ref 70–99)
HCT VFR BLD CALC: 34.2 % — LOW (ref 34.5–45)
HGB BLD-MCNC: 11.8 G/DL — SIGNIFICANT CHANGE UP (ref 11.5–15.5)
MCHC RBC-ENTMCNC: 29.6 PG — SIGNIFICANT CHANGE UP (ref 27–34)
MCHC RBC-ENTMCNC: 34.5 G/DL — SIGNIFICANT CHANGE UP (ref 32–36)
MCV RBC AUTO: 85.9 FL — SIGNIFICANT CHANGE UP (ref 80–100)
NRBC # BLD AUTO: 0 K/UL — SIGNIFICANT CHANGE UP (ref 0–0)
NRBC # FLD: 0 K/UL — SIGNIFICANT CHANGE UP (ref 0–0)
NRBC BLD AUTO-RTO: 0 /100 WBCS — SIGNIFICANT CHANGE UP (ref 0–0)
PLATELET # BLD AUTO: 203 K/UL — SIGNIFICANT CHANGE UP (ref 150–400)
PMV BLD: 9.4 FL — SIGNIFICANT CHANGE UP (ref 7–13)
POTASSIUM SERPL-MCNC: 3.8 MMOL/L — SIGNIFICANT CHANGE UP (ref 3.5–5.3)
POTASSIUM SERPL-SCNC: 3.8 MMOL/L — SIGNIFICANT CHANGE UP (ref 3.5–5.3)
RBC # BLD: 3.98 M/UL — SIGNIFICANT CHANGE UP (ref 3.8–5.2)
RBC # FLD: 12.5 % — SIGNIFICANT CHANGE UP (ref 10.3–14.5)
RV RNA STL NAA+PROBE: DETECTED
SODIUM SERPL-SCNC: 139 MMOL/L — SIGNIFICANT CHANGE UP (ref 135–145)
WBC # BLD: 4.76 K/UL — SIGNIFICANT CHANGE UP (ref 3.8–10.5)
WBC # FLD AUTO: 4.76 K/UL — SIGNIFICANT CHANGE UP (ref 3.8–10.5)

## 2025-02-27 PROCEDURE — 0225U NFCT DS DNA&RNA 21 SARSCOV2: CPT

## 2025-02-27 PROCEDURE — 99285 EMERGENCY DEPT VISIT HI MDM: CPT | Mod: 25

## 2025-02-27 PROCEDURE — 96374 THER/PROPH/DIAG INJ IV PUSH: CPT

## 2025-02-27 PROCEDURE — 71045 X-RAY EXAM CHEST 1 VIEW: CPT

## 2025-02-27 PROCEDURE — 80053 COMPREHEN METABOLIC PANEL: CPT

## 2025-02-27 PROCEDURE — 96375 TX/PRO/DX INJ NEW DRUG ADDON: CPT

## 2025-02-27 PROCEDURE — 80048 BASIC METABOLIC PNL TOTAL CA: CPT

## 2025-02-27 PROCEDURE — 85025 COMPLETE CBC W/AUTO DIFF WBC: CPT

## 2025-02-27 PROCEDURE — 87507 IADNA-DNA/RNA PROBE TQ 12-25: CPT

## 2025-02-27 PROCEDURE — 81001 URINALYSIS AUTO W/SCOPE: CPT

## 2025-02-27 PROCEDURE — 85027 COMPLETE CBC AUTOMATED: CPT

## 2025-02-27 PROCEDURE — 99223 1ST HOSP IP/OBS HIGH 75: CPT

## 2025-02-27 PROCEDURE — 74176 CT ABD & PELVIS W/O CONTRAST: CPT | Mod: MC

## 2025-02-27 PROCEDURE — 83690 ASSAY OF LIPASE: CPT

## 2025-02-27 PROCEDURE — 36415 COLL VENOUS BLD VENIPUNCTURE: CPT

## 2025-02-27 RX ORDER — FENOFIBRATE 160 MG/1
1 TABLET ORAL
Refills: 0 | DISCHARGE

## 2025-02-27 RX ORDER — MELATONIN 5 MG
3 TABLET ORAL AT BEDTIME
Refills: 0 | Status: DISCONTINUED | OUTPATIENT
Start: 2025-02-27 | End: 2025-02-27

## 2025-02-27 RX ORDER — ONDANSETRON HCL/PF 4 MG/2 ML
4 VIAL (ML) INJECTION EVERY 8 HOURS
Refills: 0 | Status: DISCONTINUED | OUTPATIENT
Start: 2025-02-27 | End: 2025-02-27

## 2025-02-27 RX ORDER — ENOXAPARIN SODIUM 100 MG/ML
40 INJECTION SUBCUTANEOUS EVERY 24 HOURS
Refills: 0 | Status: DISCONTINUED | OUTPATIENT
Start: 2025-02-27 | End: 2025-02-27

## 2025-02-27 RX ORDER — ACETAMINOPHEN 500 MG/5ML
650 LIQUID (ML) ORAL EVERY 6 HOURS
Refills: 0 | Status: DISCONTINUED | OUTPATIENT
Start: 2025-02-27 | End: 2025-02-27

## 2025-02-27 RX ORDER — FENOFIBRATE 160 MG/1
145 TABLET ORAL DAILY
Refills: 0 | Status: DISCONTINUED | OUTPATIENT
Start: 2025-02-27 | End: 2025-02-27

## 2025-02-27 RX ORDER — MAGNESIUM SULFATE 500 MG/ML
1 SYRINGE (ML) INJECTION ONCE
Refills: 0 | Status: COMPLETED | OUTPATIENT
Start: 2025-02-27 | End: 2025-02-27

## 2025-02-27 RX ORDER — MAGNESIUM, ALUMINUM HYDROXIDE 200-200 MG
30 TABLET,CHEWABLE ORAL EVERY 4 HOURS
Refills: 0 | Status: DISCONTINUED | OUTPATIENT
Start: 2025-02-27 | End: 2025-02-27

## 2025-02-27 RX ADMIN — Medication 100 GRAM(S): at 01:03

## 2025-02-27 RX ADMIN — Medication 40 MILLIEQUIVALENT(S): at 08:28

## 2025-02-27 RX ADMIN — Medication 40 MILLIEQUIVALENT(S): at 01:03

## 2025-02-27 RX ADMIN — ENOXAPARIN SODIUM 40 MILLIGRAM(S): 100 INJECTION SUBCUTANEOUS at 11:17

## 2025-02-27 RX ADMIN — FENOFIBRATE 145 MILLIGRAM(S): 160 TABLET ORAL at 11:17

## 2025-02-27 RX ADMIN — Medication 100 MILLILITER(S): at 05:34

## 2025-02-27 NOTE — H&P ADULT - ASSESSMENT
60yo F with PMHx of Colon CA s/p resection with colostomy bag presented to ED c/o nausea, vomiting and diarrhea with abd pain for the past 2-3 days.    Gastroenteritis  -likely viral etiology   -Currently with improving symptoms   -afebrile, no leukocytosis   -CT a/p Intraluminal fluid throughout the bowel, which may represent infectious   versus inflammatory enterocolitis/diarrheal disease.  -Check GI PRC   -Cont with IVF hydration  -Zofran for nausea      ARF  -prerenal 2/2 due diarrhea, decreased po intake   -Cr 3.2/52.3 on presentation, s/p 3L IVF bolus repeat Cr 1.89  -cont with IVF hydration   -renally dose medications  -check repeat BMP    HLD   -cont Fenofibrate     DVT ppx  -Lovenox subQ   Dispo: active, anticipated dc 1-2days        Time-based billing (NON-critical care).     76 minutes spent on total encounter. The necessity of the time spent during the encounter on this date of service was due to:   chart review, patient evaluation, independent history taking, documentation, coordination of care and discussion with patient, nurse and patient's daughter at bedside.

## 2025-02-27 NOTE — DISCHARGE NOTE PROVIDER - NSDCFUADDAPPT_GEN_ALL_CORE_FT
APPTS ARE READY TO BE MADE: [X] YES    Best Family or Patient Contact (if needed):    Additional Information about above appointments (if needed):    1: Please follow up with you PCP within 1 week of discharge.   2:   3:     Other comments or requests:

## 2025-02-27 NOTE — DISCHARGE NOTE PROVIDER - HOSPITAL COURSE
60yo F with PMHx of Colon CA s/p resection with colostomy bag presented to ED c/o nausea, vomiting and diarrhea with abd pain for the past 2-3 days.    # Lively Viral Gastroenteritis  # PRANAV, likely resolving ATN  -CT a/p Intraluminal fluid throughout the bowel, which may represent infectious versus inflammatory enterocolitis/diarrheal disease.  -Patient clinically improved with supportive care, IVF  -Tolerating her lunch well today; she feels comfortable going home later today if she tolerated dinner.   - Denies nausea, diarrhea and abdominal pain currently.   -Cr improved from 3.2 to 0.91 on discharge.     # HLD   -cont Fenofibrate 58yo F with PMHx of Colon CA s/p resection with colostomy bag presented to ED c/o nausea, vomiting and diarrhea with abd pain for the past 2-3 days.    # Viral Gastroenteritis - rotavirus A infection (+GI PCR)  # PRANAV, likely resolving ATN  -CT a/p Intraluminal fluid throughout the bowel, which may represent infectious versus inflammatory enterocolitis/diarrheal disease.  -Patient clinically improved with supportive care, IVF  -Tolerating her lunch well today; she feels comfortable going home later today if she tolerated dinner.   - Denies nausea, diarrhea and abdominal pain currently.   -Cr improved from 3.2 to 0.91 on discharge.     # HLD   -cont Fenofibrate

## 2025-02-27 NOTE — DISCHARGE NOTE NURSING/CASE MANAGEMENT/SOCIAL WORK - FINANCIAL ASSISTANCE
[facility] provides services at a reduced cost to those who are determined to be eligible through [facility]’s financial assistance program. Information regarding [facility]’s financial assistance program can be found by going to [email or web address] or by calling [phone].

## 2025-02-27 NOTE — H&P ADULT - NSHPPHYSICALEXAM_GEN_ALL_CORE
T(C): 36.7 (02-27-25 @ 03:53), Max: 36.8 (02-26-25 @ 23:18)  HR: 72 (02-27-25 @ 03:53) (72 - 113)  BP: 127/78 (02-27-25 @ 03:53) (112/71 - 127/78)  RR: 16 (02-27-25 @ 03:53) (16 - 20)  SpO2: 95% (02-27-25 @ 03:53) (95% - 99%)    GENERAL: patient appears well, no acute distress, appropriate, pleasant  EYES: sclera clear, no exudates  ENMT: oropharynx clear without erythema, no exudates, moist mucous membranes  NECK: supple, soft, no thyromegaly noted  LUNGS: good air entry bilaterally, clear to auscultation, symmetric breath sounds, no wheezing or rhonchi appreciated  HEART: soft S1/S2, regular rate and rhythm, no murmurs noted, no lower extremity edema  GASTROINTESTINAL: abdomen is soft, nontender, nondistended, normoactive bowel sounds, no palpable masses, colostomy bag   INTEGUMENT: good skin turgor, warm skin, appears well perfused  MUSCULOSKELETAL: no clubbing or cyanosis, no obvious deformity  NEUROLOGIC: awake, alert, oriented x3, good muscle tone in 4 extremities, no obvious sensory deficits  PSYCHIATRIC: mood is good, affect is congruent, linear and logical thought process  HEME/LYMPH: no palpable supraclavicular nodules, no obvious ecchymosis or petechiae

## 2025-02-27 NOTE — PATIENT PROFILE ADULT - FALL HARM RISK - UNIVERSAL INTERVENTIONS
Bed in lowest position, wheels locked, appropriate side rails in place/Call bell, personal items and telephone in reach/Instruct patient to call for assistance before getting out of bed or chair/Non-slip footwear when patient is out of bed/[place] to call system/Physically safe environment - no spills, clutter or unnecessary equipment/Purposeful Proactive Rounding/Room/bathroom lighting operational, light cord in reach

## 2025-02-27 NOTE — CHART NOTE - NSCHARTNOTEFT_GEN_A_CORE
ISMAELNote: worker met with pt to address [place] consult. Informed about SW services ,understood. Pt states she works and lives with her adult children , denies any issues attending to her appts ,with food insecurity or prescriptions ,states she has insurance in place . Worker offered written resources for the [employer] bus  and pharmacy reduce prescription plans just for pt to have in case needed,pt accepted resources. Denies any SW needs at this moment, aware of services if needed.

## 2025-02-27 NOTE — H&P ADULT - HISTORY OF PRESENT ILLNESS
59 year old female presents with nausea, vomiting and diarrhea with abd pain with history of colon cancer with colostomy bag 58yo F with PMHx of Colon CA s/p resection with colostomy bag presented to ED c/o nausea, vomiting and diarrhea with abd pain for the past 2-3days. Pt reports that she had soup from a deli at the time symptoms onset. Denies fever, chills, rectal bleeding. Pt states that diarrhea improving.

## 2025-02-27 NOTE — DISCHARGE NOTE PROVIDER - NSDCCPCAREPLAN_GEN_ALL_CORE_FT
PRINCIPAL DISCHARGE DIAGNOSIS  Diagnosis: Gastroenteritis  Assessment and Plan of Treatment: Lively viral, resolved. Severe dehydration with acute kidney injury resolved as well. Please stay well hydrated and eat lighter foods for a couple of days.      SECONDARY DISCHARGE DIAGNOSES  Diagnosis: Acute UTI  Assessment and Plan of Treatment:

## 2025-02-27 NOTE — DISCHARGE NOTE NURSING/CASE MANAGEMENT/SOCIAL WORK - PATIENT PORTAL LINK FT
You can access the FollowMyHealth Patient Portal offered by [facility] by registering at the following website: [email or web address]. By joining [facility]’s FollowMyHealth portal, you will also be able to view your health information using other applications (apps) compatible with our system.

## 2025-03-11 ENCOUNTER — OUTPATIENT (OUTPATIENT)
Dept: OUTPATIENT SERVICES | Facility: HOSPITAL | Age: 60
LOS: 1 days | End: 2025-03-11
Payer: MEDICAID

## 2025-03-11 ENCOUNTER — TRANSCRIPTION ENCOUNTER (OUTPATIENT)
Age: 60
End: 2025-03-11

## 2025-03-11 DIAGNOSIS — Z98.89 OTHER SPECIFIED POSTPROCEDURAL STATES: Chronic | ICD-10-CM

## 2025-03-11 DIAGNOSIS — Z90.722 ACQUIRED ABSENCE OF OVARIES, BILATERAL: Chronic | ICD-10-CM

## 2025-03-11 DIAGNOSIS — M17.10 UNILATERAL PRIMARY OSTEOARTHRITIS, UNSPECIFIED KNEE: ICD-10-CM

## 2025-03-11 DIAGNOSIS — N20.0 CALCULUS OF KIDNEY: Chronic | ICD-10-CM

## 2025-03-11 DIAGNOSIS — Z09 ENCOUNTER FOR FOLLOW-UP EXAMINATION AFTER COMPLETED TREATMENT FOR CONDITIONS OTHER THAN MALIGNANT NEOPLASM: Chronic | ICD-10-CM

## 2025-03-11 PROCEDURE — 77002 NEEDLE LOCALIZATION BY XRAY: CPT

## 2025-03-11 PROCEDURE — 20610 DRAIN/INJ JOINT/BURSA W/O US: CPT | Mod: RT

## 2025-03-13 ENCOUNTER — APPOINTMENT (OUTPATIENT)
Dept: DERMATOLOGY | Facility: CLINIC | Age: 60
End: 2025-03-13
Payer: MEDICAID

## 2025-03-13 PROCEDURE — 10060 I&D ABSCESS SIMPLE/SINGLE: CPT

## 2025-03-13 PROCEDURE — 99204 OFFICE O/P NEW MOD 45 MIN: CPT | Mod: 25

## 2025-05-27 ENCOUNTER — TRANSCRIPTION ENCOUNTER (OUTPATIENT)
Age: 60
End: 2025-05-27

## 2025-05-27 ENCOUNTER — OUTPATIENT (OUTPATIENT)
Dept: OUTPATIENT SERVICES | Facility: HOSPITAL | Age: 60
LOS: 1 days | End: 2025-05-27
Payer: MEDICAID

## 2025-05-27 DIAGNOSIS — Z90.722 ACQUIRED ABSENCE OF OVARIES, BILATERAL: Chronic | ICD-10-CM

## 2025-05-27 DIAGNOSIS — N20.0 CALCULUS OF KIDNEY: Chronic | ICD-10-CM

## 2025-05-27 DIAGNOSIS — Z09 ENCOUNTER FOR FOLLOW-UP EXAMINATION AFTER COMPLETED TREATMENT FOR CONDITIONS OTHER THAN MALIGNANT NEOPLASM: Chronic | ICD-10-CM

## 2025-05-27 DIAGNOSIS — M17.11 UNILATERAL PRIMARY OSTEOARTHRITIS, RIGHT KNEE: ICD-10-CM

## 2025-05-27 DIAGNOSIS — Z98.89 OTHER SPECIFIED POSTPROCEDURAL STATES: Chronic | ICD-10-CM

## 2025-05-27 PROCEDURE — 76000 FLUOROSCOPY <1 HR PHYS/QHP: CPT

## 2025-05-27 PROCEDURE — 77002 NEEDLE LOCALIZATION BY XRAY: CPT

## 2025-05-27 PROCEDURE — 20610 DRAIN/INJ JOINT/BURSA W/O US: CPT | Mod: RT

## 2025-08-13 ENCOUNTER — APPOINTMENT (OUTPATIENT)
Dept: HEMATOLOGY ONCOLOGY | Facility: CLINIC | Age: 60
End: 2025-08-13

## 2025-08-13 ENCOUNTER — RESULT REVIEW (OUTPATIENT)
Age: 60
End: 2025-08-13

## 2025-08-13 ENCOUNTER — APPOINTMENT (OUTPATIENT)
Dept: HEMATOLOGY ONCOLOGY | Facility: CLINIC | Age: 60
End: 2025-08-13
Payer: MEDICAID

## 2025-08-13 VITALS
OXYGEN SATURATION: 97 % | WEIGHT: 175.25 LBS | RESPIRATION RATE: 16 BRPM | TEMPERATURE: 97.9 F | DIASTOLIC BLOOD PRESSURE: 80 MMHG | HEART RATE: 71 BPM | BODY MASS INDEX: 30.08 KG/M2 | SYSTOLIC BLOOD PRESSURE: 133 MMHG

## 2025-08-13 DIAGNOSIS — Z85.048 PERSONAL HISTORY OF OTHER MALIGNANT NEOPLASM OF RECTUM, RECTOSIGMOID JUNCTION, AND ANUS: ICD-10-CM

## 2025-08-13 DIAGNOSIS — R19.8 OTHER SPECIFIED SYMPTOMS AND SIGNS INVOLVING THE DIGESTIVE SYSTEM AND ABDOMEN: ICD-10-CM

## 2025-08-13 PROCEDURE — G2211 COMPLEX E/M VISIT ADD ON: CPT | Mod: NC

## 2025-08-13 PROCEDURE — 99213 OFFICE O/P EST LOW 20 MIN: CPT

## 2025-08-14 LAB
ALBUMIN SERPL ELPH-MCNC: 4.7 G/DL
ALP BLD-CCNC: 78 U/L
ALT SERPL-CCNC: 26 U/L
ANION GAP SERPL CALC-SCNC: 15 MMOL/L
AST SERPL-CCNC: 32 U/L
BILIRUB SERPL-MCNC: 0.2 MG/DL
BUN SERPL-MCNC: 21 MG/DL
CALCIUM SERPL-MCNC: 9.8 MG/DL
CEA SERPL-MCNC: 0.8 NG/ML
CHLORIDE SERPL-SCNC: 103 MMOL/L
CO2 SERPL-SCNC: 22 MMOL/L
CREAT SERPL-MCNC: 0.73 MG/DL
EGFRCR SERPLBLD CKD-EPI 2021: 95 ML/MIN/1.73M2
GLUCOSE SERPL-MCNC: 126 MG/DL
POTASSIUM SERPL-SCNC: 4.3 MMOL/L
PROT SERPL-MCNC: 7.1 G/DL
SODIUM SERPL-SCNC: 140 MMOL/L

## 2025-08-15 PROBLEM — R19.8 ABDOMINAL COMPLAINTS: Status: ACTIVE | Noted: 2025-08-15
